# Patient Record
Sex: MALE | Race: WHITE | Employment: FULL TIME | URBAN - METROPOLITAN AREA
[De-identification: names, ages, dates, MRNs, and addresses within clinical notes are randomized per-mention and may not be internally consistent; named-entity substitution may affect disease eponyms.]

---

## 2017-04-08 ENCOUNTER — HOSPITAL ENCOUNTER (EMERGENCY)
Facility: HOSPITAL | Age: 52
Discharge: HOME | End: 2017-04-08
Attending: EMERGENCY MEDICINE
Payer: COMMERCIAL

## 2017-04-08 VITALS
HEIGHT: 70 IN | OXYGEN SATURATION: 96 % | SYSTOLIC BLOOD PRESSURE: 134 MMHG | TEMPERATURE: 99 F | HEART RATE: 84 BPM | WEIGHT: 315 LBS | DIASTOLIC BLOOD PRESSURE: 78 MMHG | RESPIRATION RATE: 20 BRPM | BODY MASS INDEX: 45.1 KG/M2

## 2017-04-08 DIAGNOSIS — T78.40XA ALLERGIC REACTION, INITIAL ENCOUNTER: Primary | ICD-10-CM

## 2017-04-08 PROCEDURE — 99284 EMERGENCY DEPT VISIT MOD MDM: CPT

## 2017-04-08 PROCEDURE — S0028 INJECTION, FAMOTIDINE, 20 MG: HCPCS | Performed by: EMERGENCY MEDICINE

## 2017-04-08 PROCEDURE — 96375 TX/PRO/DX INJ NEW DRUG ADDON: CPT

## 2017-04-08 PROCEDURE — 96361 HYDRATE IV INFUSION ADD-ON: CPT

## 2017-04-08 PROCEDURE — 96374 THER/PROPH/DIAG INJ IV PUSH: CPT

## 2017-04-08 RX ORDER — DIPHENHYDRAMINE HYDROCHLORIDE 50 MG/ML
50 INJECTION INTRAMUSCULAR; INTRAVENOUS ONCE
Status: COMPLETED | OUTPATIENT
Start: 2017-04-08 | End: 2017-04-08

## 2017-04-08 RX ORDER — METHYLPREDNISOLONE SODIUM SUCCINATE 125 MG/2ML
125 INJECTION, POWDER, LYOPHILIZED, FOR SOLUTION INTRAMUSCULAR; INTRAVENOUS ONCE
Status: COMPLETED | OUTPATIENT
Start: 2017-04-08 | End: 2017-04-08

## 2017-04-08 RX ORDER — PREDNISONE 20 MG/1
60 TABLET ORAL DAILY
Qty: 9 TABLET | Refills: 0 | Status: SHIPPED | OUTPATIENT
Start: 2017-04-08 | End: 2017-04-11

## 2017-04-08 RX ORDER — FAMOTIDINE 20 MG/1
20 TABLET ORAL 2 TIMES DAILY PRN
Qty: 30 TABLET | Refills: 0 | Status: SHIPPED | OUTPATIENT
Start: 2017-04-08 | End: 2017-05-08

## 2017-04-08 RX ORDER — FAMOTIDINE 10 MG/ML
20 INJECTION, SOLUTION INTRAVENOUS ONCE
Status: COMPLETED | OUTPATIENT
Start: 2017-04-08 | End: 2017-04-08

## 2017-04-08 RX ORDER — ONDANSETRON 2 MG/ML
4 INJECTION INTRAMUSCULAR; INTRAVENOUS ONCE
Status: COMPLETED | OUTPATIENT
Start: 2017-04-08 | End: 2017-04-08

## 2017-04-08 RX ORDER — EPINEPHRINE 0.3 MG/.3ML
0.3 INJECTION SUBCUTANEOUS
Qty: 1 EACH | Refills: 0 | Status: SHIPPED | OUTPATIENT
Start: 2017-04-08 | End: 2017-05-08

## 2017-04-08 NOTE — ED NOTES
Pt states about 10 minutes ago he started to have itching to the hands and the back of the neck and feeling flushed again.   Pt does have flushing present to neck and face which family is states is still better than on arrival.  Pt respirations unlabored an

## 2017-04-08 NOTE — ED INITIAL ASSESSMENT (HPI)
Pt woke up this morning feeling like his hands were itchy and throat was tight. States he worked overnight last night and felt nauseated last night, only ate a banana and green tea over night. Around 2 am pt took his regular medications.   And around 9am

## 2017-04-08 NOTE — ED NOTES
Pt resting on cart, redness still noted but improved. Pt states his tightness in his throat is improved.

## 2017-04-08 NOTE — ED PROVIDER NOTES
Patient Seen in: [facility] Emergency Department    History   Patient presents with: Allergic Rxn Allergies (immune)    Stated Complaint: possible allergic reaction/rash/throat feels tight    HPI    Patient presents with allergic reaction.   The pat mouth daily. No family history on file. Smoking Status: Never Smoker                        Review of Systems    Positive for stated complaint: possible allergic reaction/rash/throat feels tight  Other systems are as noted in HPI.   Constitutiona ondansetron HCl (ZOFRAN) injection 4 mg (4 mg Intravenous Given 4/8/17 2315)     The patient was given IV fluids, Zofran, Pepcid, Benadryl and Solu-Medrol. He improved significantly with the medications. His rash faded significantly and is almost gone.

## 2019-11-17 ENCOUNTER — HOSPITAL ENCOUNTER (OUTPATIENT)
Facility: HOSPITAL | Age: 54
Setting detail: OBSERVATION
Discharge: HOME | End: 2019-11-18
Attending: EMERGENCY MEDICINE | Admitting: HOSPITALIST
Payer: COMMERCIAL

## 2019-11-17 ENCOUNTER — APPOINTMENT (OUTPATIENT)
Dept: CT IMAGING | Facility: HOSPITAL | Age: 54
End: 2019-11-17
Attending: EMERGENCY MEDICINE
Payer: COMMERCIAL

## 2019-11-17 DIAGNOSIS — T78.2XXA ANAPHYLAXIS, INITIAL ENCOUNTER: Primary | ICD-10-CM

## 2019-11-17 PROBLEM — R73.9 HYPERGLYCEMIA: Status: ACTIVE | Noted: 2019-11-17

## 2019-11-17 PROCEDURE — 99220 INITIAL OBSERVATION CARE,LEVL III: CPT | Performed by: HOSPITALIST

## 2019-11-17 PROCEDURE — 99291 CRITICAL CARE FIRST HOUR: CPT | Performed by: NURSE PRACTITIONER

## 2019-11-17 PROCEDURE — 74177 CT ABD & PELVIS W/CONTRAST: CPT | Performed by: EMERGENCY MEDICINE

## 2019-11-17 RX ORDER — METHYLPREDNISOLONE SODIUM SUCCINATE 125 MG/2ML
125 INJECTION, POWDER, LYOPHILIZED, FOR SOLUTION INTRAMUSCULAR; INTRAVENOUS ONCE
Status: COMPLETED | OUTPATIENT
Start: 2019-11-17 | End: 2019-11-17

## 2019-11-17 RX ORDER — HYDROMORPHONE HYDROCHLORIDE 1 MG/ML
0.5 INJECTION, SOLUTION INTRAMUSCULAR; INTRAVENOUS; SUBCUTANEOUS ONCE
Status: COMPLETED | OUTPATIENT
Start: 2019-11-17 | End: 2019-11-17

## 2019-11-17 RX ORDER — DIPHENHYDRAMINE HYDROCHLORIDE 50 MG/ML
50 INJECTION INTRAMUSCULAR; INTRAVENOUS ONCE
Status: COMPLETED | OUTPATIENT
Start: 2019-11-17 | End: 2019-11-17

## 2019-11-17 RX ORDER — BICALUTAMIDE 50 MG/1
50 TABLET, FILM COATED ORAL DAILY
COMMUNITY
End: 2021-08-09

## 2019-11-17 RX ORDER — DIPHENHYDRAMINE HCL 25 MG
50 TABLET ORAL NIGHTLY PRN
COMMUNITY

## 2019-11-17 RX ORDER — DIPHENHYDRAMINE HYDROCHLORIDE 50 MG/ML
12.5 INJECTION INTRAMUSCULAR; INTRAVENOUS EVERY 4 HOURS PRN
Status: DISCONTINUED | OUTPATIENT
Start: 2019-11-17 | End: 2019-11-18

## 2019-11-17 RX ORDER — CLINDAMYCIN PHOSPHATE 10 MG/G
GEL TOPICAL AS NEEDED
COMMUNITY

## 2019-11-17 RX ORDER — IBUPROFEN 200 MG
400 TABLET ORAL EVERY 6 HOURS PRN
Status: ON HOLD | COMMUNITY
End: 2019-11-18

## 2019-11-17 RX ORDER — ONDANSETRON 2 MG/ML
4 INJECTION INTRAMUSCULAR; INTRAVENOUS ONCE
Status: COMPLETED | OUTPATIENT
Start: 2019-11-17 | End: 2019-11-17

## 2019-11-17 RX ORDER — CETIRIZINE HYDROCHLORIDE 10 MG/1
10 TABLET ORAL DAILY
Status: DISCONTINUED | OUTPATIENT
Start: 2019-11-18 | End: 2019-11-18

## 2019-11-17 RX ORDER — CETIRIZINE HYDROCHLORIDE 10 MG/1
10 TABLET ORAL ONCE
Status: COMPLETED | OUTPATIENT
Start: 2019-11-17 | End: 2019-11-17

## 2019-11-17 RX ORDER — ONDANSETRON 2 MG/ML
4 INJECTION INTRAMUSCULAR; INTRAVENOUS ONCE
Status: DISCONTINUED | OUTPATIENT
Start: 2019-11-17 | End: 2019-11-18

## 2019-11-17 RX ORDER — ATORVASTATIN CALCIUM 20 MG/1
20 TABLET, FILM COATED ORAL NIGHTLY
COMMUNITY

## 2019-11-17 RX ORDER — TRETINOIN 0.01 %
GEL (GRAM) TOPICAL AS NEEDED
COMMUNITY

## 2019-11-17 RX ORDER — FAMOTIDINE 20 MG/1
20 TABLET ORAL 2 TIMES DAILY
Status: DISCONTINUED | OUTPATIENT
Start: 2019-11-17 | End: 2019-11-18

## 2019-11-17 RX ORDER — ONDANSETRON 2 MG/ML
4 INJECTION INTRAMUSCULAR; INTRAVENOUS EVERY 6 HOURS PRN
Status: DISCONTINUED | OUTPATIENT
Start: 2019-11-17 | End: 2019-11-18

## 2019-11-17 RX ORDER — OXYBUTYNIN CHLORIDE 5 MG/1
10 TABLET, EXTENDED RELEASE ORAL DAILY
COMMUNITY

## 2019-11-17 RX ORDER — SODIUM CHLORIDE 9 MG/ML
INJECTION, SOLUTION INTRAVENOUS CONTINUOUS
Status: DISCONTINUED | OUTPATIENT
Start: 2019-11-17 | End: 2019-11-18

## 2019-11-17 RX ORDER — METHYLPREDNISOLONE SODIUM SUCCINATE 125 MG/2ML
60 INJECTION, POWDER, LYOPHILIZED, FOR SOLUTION INTRAMUSCULAR; INTRAVENOUS EVERY 8 HOURS
Status: DISCONTINUED | OUTPATIENT
Start: 2019-11-17 | End: 2019-11-18

## 2019-11-17 RX ORDER — ACETAMINOPHEN 325 MG/1
650 TABLET ORAL EVERY 6 HOURS PRN
Status: DISCONTINUED | OUTPATIENT
Start: 2019-11-17 | End: 2019-11-18

## 2019-11-17 RX ORDER — ENOXAPARIN SODIUM 100 MG/ML
0.5 INJECTION SUBCUTANEOUS DAILY
Status: DISCONTINUED | OUTPATIENT
Start: 2019-11-17 | End: 2019-11-18

## 2019-11-17 RX ORDER — HYDROMORPHONE HYDROCHLORIDE 1 MG/ML
0.5 INJECTION, SOLUTION INTRAMUSCULAR; INTRAVENOUS; SUBCUTANEOUS ONCE
Status: COMPLETED | OUTPATIENT
Start: 2019-11-17 | End: 2019-11-18

## 2019-11-17 RX ORDER — DIPHENHYDRAMINE HCL 25 MG
25 CAPSULE ORAL EVERY 4 HOURS PRN
Status: DISCONTINUED | OUTPATIENT
Start: 2019-11-17 | End: 2019-11-18

## 2019-11-17 RX ORDER — SODIUM CHLORIDE 9 MG/ML
INJECTION, SOLUTION INTRAVENOUS CONTINUOUS
Status: DISCONTINUED | OUTPATIENT
Start: 2019-11-17 | End: 2019-11-17

## 2019-11-17 RX ORDER — FAMOTIDINE 10 MG/ML
20 INJECTION, SOLUTION INTRAVENOUS ONCE
Status: COMPLETED | OUTPATIENT
Start: 2019-11-17 | End: 2019-11-17

## 2019-11-17 RX ORDER — SILDENAFIL 25 MG/1
25 TABLET, FILM COATED ORAL DAILY
COMMUNITY
End: 2019-11-17

## 2019-11-17 NOTE — ED PROVIDER NOTES
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 ADRENALS:  No mass or enlargement. AORTA/VASCULAR:  No aneurysm or dissection. RETROPERITONEUM:  No mass or adenopathy.       BOWEL/MESENTERY:  Stomach is distended with fluid and food debris and is     otherwis 11/17/2019 at 14:05                     MDM     Patient treated immediately with epinephrine Solu-Medrol Benadryl and Pepcid in the emergency department with immediate improvement in the flushing and symptoms.   The abdominal pain also seemed to improve tho

## 2019-11-17 NOTE — ED NOTES
First contact with patient. Very limited erythema noted across body, slightly on back of neck. Lips and tongue feel normal. Pt is aware of his imminent admission.

## 2019-11-17 NOTE — H&P
[facility]ITAL[facility]  History and Physical     Josiah Leilani Patient Status:  Emergency    1/10/1965 MRN [id number]   Location [facility] Attending Tiuts Chavez MD   Hosp Day # 0 PCP ALANA CLEMENS     Chief Complaint: Vomiting, sounds. No rebound, guarding or organomegaly. Neurologic: No focal neurological deficits. CNII-XII grossly intact. Musculoskeletal: Moves all extremities. Extremities: No edema or cyanosis. Integument:Hives  Psychiatric: Appropriate mood and affect.

## 2019-11-17 NOTE — PROGRESS NOTES
[facility] Pharmacy Note:  Anticoagulation Weight Dose Adjustment for enoxaparin (LOVENOX)    Britni Edmond is a 62year old male who has been prescribed enoxaparin (LOVENOX) 40mg every 24 hours.   CrCl is estimated creatinine clearance is 113.6 mL/min (based on SCr

## 2019-11-17 NOTE — ED INITIAL ASSESSMENT (HPI)
Mid lower abdominal pain x7 hours. C/o nausea, vomiting, diarrhea. Afebrile. Went out to eat last night. Spouse ate same thing as pt.   Nothing unusual

## 2019-11-18 VITALS
SYSTOLIC BLOOD PRESSURE: 125 MMHG | DIASTOLIC BLOOD PRESSURE: 75 MMHG | RESPIRATION RATE: 20 BRPM | HEART RATE: 86 BPM | BODY MASS INDEX: 30.49 KG/M2 | OXYGEN SATURATION: 95 % | WEIGHT: 212.94 LBS | TEMPERATURE: 98 F | HEIGHT: 70 IN

## 2019-11-18 PROCEDURE — 99217 OBSERVATION CARE DISCHARGE: CPT | Performed by: HOSPITALIST

## 2019-11-18 RX ORDER — HYDROMORPHONE HYDROCHLORIDE 1 MG/ML
0.5 INJECTION, SOLUTION INTRAMUSCULAR; INTRAVENOUS; SUBCUTANEOUS ONCE
Status: COMPLETED | OUTPATIENT
Start: 2019-11-18 | End: 2019-11-18

## 2019-11-18 RX ORDER — METHYLPREDNISOLONE 4 MG/1
TABLET ORAL
Qty: 21 TABLET | Refills: 0 | Status: SHIPPED | OUTPATIENT
Start: 2019-11-18 | End: 2021-08-09

## 2019-11-18 RX ORDER — IBUPROFEN 400 MG/1
400 TABLET ORAL EVERY 6 HOURS PRN
Status: DISCONTINUED | OUTPATIENT
Start: 2019-11-18 | End: 2019-11-18

## 2019-11-18 RX ORDER — CETIRIZINE HYDROCHLORIDE 10 MG/1
10 TABLET ORAL DAILY
Qty: 30 TABLET | Refills: 0 | Status: SHIPPED | OUTPATIENT
Start: 2019-11-19

## 2019-11-18 NOTE — PROGRESS NOTES
ICU  Critical Care APRN Progress Note    NAME: Abel Clayton - ROOM: [address] [place], [place] [address] - MRN: [id number] - Age: 62year old - :1/10/1965    History Of Present Illness:  Abel Clayton is a 62year old male with PMHx significant for prostate cancer, hypertension and h General Appearance: Alert, cooperative, no distress, appears stated age  Neck: No JVD, neck supple, no adenopathy, trachea midline, no carotid bruits, airway patent, no difficulty breathing   Lungs: Clear to auscultation bilaterally, respirations unlabored -allergy referral on d/c for further workup    #Htn  -maintain SBP<160    #hld  -statin per IM           F/E/N:  Reg diet     Proph:  -SQ lovenox  -SCD    Dispo:   -Full code    Plan of care discussed with intensivist on-call, Dr. Deepika Live

## 2019-11-18 NOTE — PROGRESS NOTES
Patient with pain overnight, now resolved. Observe past lunch, if doing well will discharge. He has obtained follow up with allergist at [facility]. He has an epipen.  Given prolonged symptoms, will treat with medrol dose pack and [facility]

## 2019-11-18 NOTE — CONSULTS
[facility]  Report of Consultation    Cristela Grey Patient Status:  Observation    1/10/1965 MRN [id number]   [facility] 4SW-A Attending Roxana Paniagua MD   Hosp Day # 0 PCP Garry Flor     Reason for Consultation: Anaphylactic reac History   Problem Relation Age of Onset   • Cancer Father         lung   • Heart Disorder Father         a fib   • Heart Disorder Mother         afib   • Cancer Maternal Grandfather         prostate   • Heart Disorder Maternal Grandfather         cbag Constitutional: Negative for anorexia, chills, fatigue, fevers, malaise, night sweats and weight loss. Eyes: Negative for visual disturbance, irritation and redness.   Ears, nose, mouth, throat, and face: Negative for hearing loss, tinnitus, nasal conges midline, no adenopathy, no thyromegaly. Lungs: Clear to auscultation and percussion   Chest wall: No tenderness or deformity. Heart: Regular rate and rhythm, normal sinus rhythm, normal S1S2, no murmur.    Abdomen: soft, non-tender, non-distended, no ma

## 2019-11-18 NOTE — PLAN OF CARE
Assumed care at [address] pt on bed, on room air. C/o nausea and headache. Zofran and Tylenol given. ST on tele 100s  Slight redness on the back of the neck. Solumedrol. Vital sign stable. Plan:d/c home tomorrow.     Problem: Patient/Family Goals  Goal PAIN - ADULT  Goal: Verbalizes/displays adequate comfort level or patient's stated pain goal  Description  INTERVENTIONS:  - Encourage pt to monitor pain and request assistance  - Assess pain using appropriate pain scale  - Administer analgesics based on t

## 2019-11-18 NOTE — PLAN OF CARE
Received pt this AM. A&Ox4 with complaints of HA. PO Motrin given with relief. Pt OOB independently. No residual anaphylactic symptoms. Ate lunch this afternoon and pt \"feeling much better\". Pt stable for d/c home.  Explained Dr. Eden Cantrell

## 2019-11-18 NOTE — PLAN OF CARE
Assumed care of patient around 1700, alert and oriented X4, spouse at bedside, no c/o CP/SOB, SpO2 96 % on RA  Rhythm/HR: ST 110s    Slight rash to back of neck  Admission navigator completed  Plan of care discussed  Call light within reach, oriented to ro

## 2019-11-19 NOTE — DISCHARGE SUMMARY
[facility]OSPITAL[facility]  DISCHARGE SUMMARY     Britni Edmond Patient Status:  Observation    1/10/1965 MRN [id number]   [facility] 4SW-A Attending No att. providers found   Hosp Day # 0 PCP Alicia Ace     Date of Admission: 2019  Date of Instructions Prescription details   aspirin 81 MG Tabs      Take 81 mg by mouth daily. Refills:  0     atorvastatin 20 MG Tabs  Commonly known as:  LIPITOR      Take 20 mg by mouth nightly.    Refills:  0     B COMPLEX OR      Take 1 tablet by mouth d rhonchi. Cardiovascular: S1, S2. Regular rate and rhythm. No murmurs, rubs or gallops. Abdomen: Soft, nontender, nondistended. Positive bowel sounds. No rebound or guarding. Neurologic: No focal neurological deficits.    Musculoskeletal: Moves all extr

## 2021-08-09 ENCOUNTER — HOSPITAL ENCOUNTER (OUTPATIENT)
Age: 56
Discharge: HOME | End: 2021-08-09
Payer: COMMERCIAL

## 2021-08-09 VITALS
WEIGHT: 220 LBS | OXYGEN SATURATION: 97 % | HEART RATE: 96 BPM | TEMPERATURE: 99 F | RESPIRATION RATE: 16 BRPM | BODY MASS INDEX: 31.5 KG/M2 | SYSTOLIC BLOOD PRESSURE: 133 MMHG | HEIGHT: 70 IN | DIASTOLIC BLOOD PRESSURE: 92 MMHG

## 2021-08-09 DIAGNOSIS — U07.1 COVID-19: Primary | ICD-10-CM

## 2021-08-09 LAB — SARS-COV-2 RNA RESP QL NAA+PROBE: DETECTED

## 2021-08-09 PROCEDURE — 99213 OFFICE O/P EST LOW 20 MIN: CPT

## 2021-08-09 PROCEDURE — 99453 REM MNTR PHYSIOL PARAM SETUP: CPT

## 2021-08-09 PROCEDURE — 99214 OFFICE O/P EST MOD 30 MIN: CPT

## 2021-08-09 RX ORDER — PIMECROLIMUS 10 MG/G
CREAM TOPICAL 2 TIMES DAILY
COMMUNITY
Start: 2021-04-01

## 2021-08-09 RX ORDER — ONDANSETRON 8 MG/1
8 TABLET, ORALLY DISINTEGRATING ORAL AS NEEDED
COMMUNITY
Start: 2021-07-27

## 2021-08-09 RX ORDER — EPINEPHRINE 0.3 MG/.3ML
0.3 INJECTION SUBCUTANEOUS AS NEEDED
COMMUNITY
Start: 2021-07-27

## 2021-08-09 NOTE — ED PROVIDER NOTES
Patient Seen in: [facility] [place]      History   Patient presents with:  Ear Problem Pain    Stated Complaint: cough / ear pain aches    HPI/Subjective:   HPI    23-year-old male who comes in today complaining of having 4 days of ear pain, coug for age   Head: Normocephalic, without obvious abnormality   Eyes:  conjunctiva and cornea clear, both eyes   Ears: TMs bilaterally pearly gray with good light reflex.  ear canals clear bilaterally no mastoid tenderness  Throat: no trismus or drooling no ph patient quarantine instructions, COVID-19 instructions and conservative care for viral syndrome including over-the-counter medications.    Patient will remain self quarantined for 10 days from the start of symptoms or 10 days from positive test of asymptoma

## 2021-08-09 NOTE — ED QUICK NOTES
Patient resting comfortably. Infusion has completed and was flushed. Wife also in the room with the patient. Denies needing anything at this time.

## 2021-08-09 NOTE — ED INITIAL ASSESSMENT (HPI)
Started with right ear pain on Thursday, a cough, and nasal congestion. The cough is generally dry and only coughed up green mucus x 1. Friday night he states he felt generalize fatigue and not feeling well.  Today he is running a low grade fever and chills

## 2022-02-22 ENCOUNTER — HOSPITAL ENCOUNTER (EMERGENCY)
Facility: HOSPITAL | Age: 57
Discharge: HOME | End: 2022-02-23
Attending: EMERGENCY MEDICINE
Payer: COMMERCIAL

## 2022-02-22 DIAGNOSIS — R31.0 GROSS HEMATURIA: Primary | ICD-10-CM

## 2022-02-22 LAB
ALBUMIN SERPL-MCNC: 3.5 G/DL (ref 3.4–5)
ALBUMIN/GLOB SERPL: 1.1 {RATIO} (ref 1–2)
ALP LIVER SERPL-CCNC: 48 U/L
ALT SERPL-CCNC: 32 U/L
ANION GAP SERPL CALC-SCNC: 1 MMOL/L (ref 0–18)
AST SERPL-CCNC: 31 U/L (ref 15–37)
BASOPHILS # BLD AUTO: 0.03 X10(3) UL (ref 0–0.2)
BASOPHILS NFR BLD AUTO: 0.6 %
BILIRUB SERPL-MCNC: 0.4 MG/DL (ref 0.1–2)
BILIRUB UR QL STRIP.AUTO: NEGATIVE
BUN BLD-MCNC: 9 MG/DL (ref 7–18)
CALCIUM BLD-MCNC: 9 MG/DL (ref 8.5–10.1)
CHLORIDE SERPL-SCNC: 108 MMOL/L (ref 98–112)
CLARITY UR REFRACT.AUTO: CLEAR
CO2 SERPL-SCNC: 31 MMOL/L (ref 21–32)
CREAT BLD-MCNC: 0.77 MG/DL
EOSINOPHIL # BLD AUTO: 0.17 X10(3) UL (ref 0–0.7)
EOSINOPHIL NFR BLD AUTO: 3.2 %
ERYTHROCYTE [DISTWIDTH] IN BLOOD BY AUTOMATED COUNT: 13.3 %
GLOBULIN PLAS-MCNC: 3.2 G/DL (ref 2.8–4.4)
GLUCOSE BLD-MCNC: 91 MG/DL (ref 70–99)
GLUCOSE UR STRIP.AUTO-MCNC: NEGATIVE MG/DL
HCT VFR BLD AUTO: 43.5 %
HGB BLD-MCNC: 13.9 G/DL
IMM GRANULOCYTES # BLD AUTO: 0.02 X10(3) UL (ref 0–1)
IMM GRANULOCYTES NFR BLD: 0.4 %
KETONES UR STRIP.AUTO-MCNC: NEGATIVE MG/DL
LEUKOCYTE ESTERASE UR QL STRIP.AUTO: NEGATIVE
LYMPHOCYTES # BLD AUTO: 1.26 X10(3) UL (ref 1–4)
LYMPHOCYTES NFR BLD AUTO: 23.7 %
MCH RBC QN AUTO: 27.6 PG (ref 26–34)
MCHC RBC AUTO-ENTMCNC: 32 G/DL (ref 31–37)
MCV RBC AUTO: 86.5 FL
MONOCYTES # BLD AUTO: 0.73 X10(3) UL (ref 0.1–1)
MONOCYTES NFR BLD AUTO: 13.7 %
NEUTROPHILS # BLD AUTO: 3.1 X10 (3) UL (ref 1.5–7.7)
NEUTROPHILS # BLD AUTO: 3.1 X10(3) UL (ref 1.5–7.7)
NEUTROPHILS NFR BLD AUTO: 58.4 %
NITRITE UR QL STRIP.AUTO: NEGATIVE
OSMOLALITY SERPL CALC.SUM OF ELEC: 288 MOSM/KG (ref 275–295)
PH UR STRIP.AUTO: 8 [PH] (ref 5–8)
PLATELET # BLD AUTO: 152 10(3)UL (ref 150–450)
POTASSIUM SERPL-SCNC: 4.4 MMOL/L (ref 3.5–5.1)
PROT SERPL-MCNC: 6.7 G/DL (ref 6.4–8.2)
PROT UR STRIP.AUTO-MCNC: NEGATIVE MG/DL
RBC # BLD AUTO: 5.03 X10(6)UL
RBC #/AREA URNS AUTO: >10 /HPF
SODIUM SERPL-SCNC: 140 MMOL/L (ref 136–145)
SP GR UR STRIP.AUTO: 1 (ref 1–1.03)
UROBILINOGEN UR STRIP.AUTO-MCNC: <2 MG/DL
WBC # BLD AUTO: 5.3 X10(3) UL (ref 4–11)

## 2022-02-22 PROCEDURE — 85025 COMPLETE CBC W/AUTO DIFF WBC: CPT

## 2022-02-22 PROCEDURE — 81001 URINALYSIS AUTO W/SCOPE: CPT

## 2022-02-22 PROCEDURE — 80053 COMPREHEN METABOLIC PANEL: CPT

## 2022-02-22 PROCEDURE — 80053 COMPREHEN METABOLIC PANEL: CPT | Performed by: EMERGENCY MEDICINE

## 2022-02-22 PROCEDURE — 99284 EMERGENCY DEPT VISIT MOD MDM: CPT

## 2022-02-22 PROCEDURE — 85025 COMPLETE CBC W/AUTO DIFF WBC: CPT | Performed by: EMERGENCY MEDICINE

## 2022-02-22 PROCEDURE — 81001 URINALYSIS AUTO W/SCOPE: CPT | Performed by: EMERGENCY MEDICINE

## 2022-02-22 PROCEDURE — 36415 COLL VENOUS BLD VENIPUNCTURE: CPT

## 2022-02-23 ENCOUNTER — APPOINTMENT (OUTPATIENT)
Dept: CT IMAGING | Facility: HOSPITAL | Age: 57
End: 2022-02-23
Attending: EMERGENCY MEDICINE
Payer: COMMERCIAL

## 2022-02-23 VITALS
TEMPERATURE: 98 F | RESPIRATION RATE: 16 BRPM | DIASTOLIC BLOOD PRESSURE: 87 MMHG | WEIGHT: 230 LBS | SYSTOLIC BLOOD PRESSURE: 133 MMHG | OXYGEN SATURATION: 97 % | BODY MASS INDEX: 32.93 KG/M2 | HEART RATE: 65 BPM | HEIGHT: 70 IN

## 2022-02-23 PROCEDURE — 74177 CT ABD & PELVIS W/CONTRAST: CPT | Performed by: EMERGENCY MEDICINE

## 2022-02-23 RX ORDER — IOHEXOL 350 MG/ML
100 INJECTION, SOLUTION INTRAVENOUS
Status: COMPLETED | OUTPATIENT
Start: 2022-02-23 | End: 2022-02-23

## 2022-02-23 NOTE — ED INITIAL ASSESSMENT (HPI)
C/o bloody urine for about a month. Pt states to day he has passed a couple clots today. Hx of prostate cancer. No issues urinating, just passing clots.

## 2022-05-27 ENCOUNTER — IMMUNIZATION (OUTPATIENT)
Dept: LAB | Age: 57
End: 2022-05-27
Attending: EMERGENCY MEDICINE
Payer: COMMERCIAL

## 2022-05-27 ENCOUNTER — HBO CONSULT (OUTPATIENT)
Dept: WOUND CARE | Facility: HOSPITAL | Age: 57
End: 2022-05-27
Attending: INTERNAL MEDICINE
Payer: COMMERCIAL

## 2022-05-27 VITALS
HEART RATE: 73 BPM | DIASTOLIC BLOOD PRESSURE: 81 MMHG | SYSTOLIC BLOOD PRESSURE: 128 MMHG | RESPIRATION RATE: 16 BRPM | HEIGHT: 70 IN | TEMPERATURE: 99 F | BODY MASS INDEX: 33.64 KG/M2 | WEIGHT: 235 LBS

## 2022-05-27 DIAGNOSIS — G47.33 OBSTRUCTIVE SLEEP APNEA OF ADULT: ICD-10-CM

## 2022-05-27 DIAGNOSIS — E66.09 CLASS 1 OBESITY DUE TO EXCESS CALORIES WITH SERIOUS COMORBIDITY AND BODY MASS INDEX (BMI) OF 33.0 TO 33.9 IN ADULT: ICD-10-CM

## 2022-05-27 DIAGNOSIS — I25.10 CORONARY ARTERY DISEASE INVOLVING NATIVE HEART WITHOUT ANGINA PECTORIS, UNSPECIFIED VESSEL OR LESION TYPE: ICD-10-CM

## 2022-05-27 DIAGNOSIS — E11.9 CONTROLLED TYPE 2 DIABETES MELLITUS WITHOUT COMPLICATION, WITHOUT LONG-TERM CURRENT USE OF INSULIN (HCC): ICD-10-CM

## 2022-05-27 DIAGNOSIS — Z23 NEED FOR VACCINATION: Primary | ICD-10-CM

## 2022-05-27 DIAGNOSIS — C61 PROSTATE CANCER (HCC): ICD-10-CM

## 2022-05-27 DIAGNOSIS — N30.40 CHRONIC RADIATION CYSTITIS: Primary | ICD-10-CM

## 2022-05-27 PROCEDURE — 0064A SARSCOV2 VAC 50MCG/0.25ML IM: CPT

## 2022-05-27 PROCEDURE — 99213 OFFICE O/P EST LOW 20 MIN: CPT

## 2022-05-27 NOTE — PROGRESS NOTES
Weekly Wound Education Note    Teaching Provided To: Patient; Family  Training Topics: Discharge instructions;HBO  Training Method: Explain/Verbal  Training Response: Patient responds and understands        Notes: HBO consult. Pt to return next week for a tech consult.

## 2022-06-02 ENCOUNTER — HBO CONSULT (OUTPATIENT)
Dept: WOUND CARE | Facility: HOSPITAL | Age: 57
End: 2022-06-02
Attending: INTERNAL MEDICINE
Payer: COMMERCIAL

## 2022-08-03 ENCOUNTER — HOSPITAL ENCOUNTER (OUTPATIENT)
Age: 57
Discharge: HOME | End: 2022-08-03
Payer: COMMERCIAL

## 2022-08-03 VITALS
OXYGEN SATURATION: 96 % | SYSTOLIC BLOOD PRESSURE: 138 MMHG | TEMPERATURE: 99 F | WEIGHT: 230 LBS | BODY MASS INDEX: 32.93 KG/M2 | DIASTOLIC BLOOD PRESSURE: 99 MMHG | HEIGHT: 70 IN | HEART RATE: 90 BPM | RESPIRATION RATE: 18 BRPM

## 2022-08-03 DIAGNOSIS — U07.1 COVID: Primary | ICD-10-CM

## 2022-08-03 LAB — SARS-COV-2 RNA RESP QL NAA+PROBE: DETECTED

## 2022-08-03 PROCEDURE — 99213 OFFICE O/P EST LOW 20 MIN: CPT

## 2022-08-03 RX ORDER — NIRMATRELVIR AND RITONAVIR 300-100 MG
KIT ORAL
Qty: 30 TABLET | Refills: 0 | Status: SHIPPED | OUTPATIENT
Start: 2022-08-03 | End: 2022-08-08

## 2022-08-16 ENCOUNTER — TELEPHONE (OUTPATIENT)
Dept: WOUND CARE | Facility: HOSPITAL | Age: 57
End: 2022-08-16

## 2022-08-22 ENCOUNTER — HBO THERAPY VISIT (OUTPATIENT)
Dept: WOUND CARE | Facility: HOSPITAL | Age: 57
End: 2022-08-22
Attending: INTERNAL MEDICINE
Payer: COMMERCIAL

## 2022-08-22 VITALS
TEMPERATURE: 98 F | RESPIRATION RATE: 10 BRPM | SYSTOLIC BLOOD PRESSURE: 153 MMHG | DIASTOLIC BLOOD PRESSURE: 108 MMHG | HEART RATE: 65 BPM

## 2022-08-22 LAB
GLUCOSE BLD-MCNC: 102 MG/DL (ref 70–99)
GLUCOSE BLD-MCNC: 90 MG/DL (ref 70–99)

## 2022-08-22 PROCEDURE — 82962 GLUCOSE BLOOD TEST: CPT | Performed by: INTERNAL MEDICINE

## 2022-08-22 NOTE — PROGRESS NOTES
Pre/Post Treatment Evaluation      Pre-Treatment Lung Evaluation: Clear to auscultation bilaterally    Left Ear Pre-Treatment Evaluation  Left Ear Clear: Yes  Left Ear Intact: Yes  Left Cerumen Removal: No  Pre Left teed stgstrstastdstest:st st1st grade    Right Ear Pre-Treatment Evaluation  Right Ear Clear: Yes  Right Ear Intact: Yes  Right Cerumen Removal: No  Pre Right teed stgstrstastdstest:st st1st grade      Post-Treatment Lung Evaluation: Clear to auscultation bilaterally    Left Ear Post-Treatment Evaluation  Left Ear Clear: Yes  Left Ear Intact: Yes  Left Cerumen Removal: No  Post Left teed stgstrstastdstest:st st1st grade    Right Ear Post-Treatment Evaluation  Right Ear Clear: Yes  Right Ear Intact: Yes  Right Cerumen Removal: No  Post Right teed stgstrstastdstest:st st1st grade      I supervised this Hyperbaric treatment and was immediately available throughout the course of the treatment. There is a trained emergency support team and ICU available at this facility to assist with complications.

## 2022-08-23 ENCOUNTER — HBO THERAPY VISIT (OUTPATIENT)
Dept: WOUND CARE | Facility: HOSPITAL | Age: 57
End: 2022-08-23
Attending: FAMILY MEDICINE
Payer: COMMERCIAL

## 2022-08-23 VITALS
DIASTOLIC BLOOD PRESSURE: 87 MMHG | HEART RATE: 79 BPM | SYSTOLIC BLOOD PRESSURE: 146 MMHG | RESPIRATION RATE: 10 BRPM | TEMPERATURE: 98 F

## 2022-08-23 PROCEDURE — 99183 HYPERBARIC OXYGEN THERAPY: CPT | Performed by: FAMILY MEDICINE

## 2022-08-23 NOTE — PROGRESS NOTES
Pre/Post Treatment Evaluation      Pre-Treatment Lung Evaluation: Clear to auscultation bilaterally    Left Ear Pre-Treatment Evaluation  Left Ear Clear: Yes  Left Ear Intact: Yes  Left Cerumen Removal: No  Pre Left teed stgstrstastdstest:st st1st grade    Right Ear Pre-Treatment Evaluation  Right Ear Clear: Yes  Right Ear Intact: Yes  Right Cerumen Removal: No  Pre Right teed stgstrstastdstest:st st1st grade      Post-Treatment Lung Evaluation: Clear to auscultation bilaterally    Left Ear Post-Treatment Evaluation  Left Ear Clear: Yes  Left Ear Intact: Yes  Left Cerumen Removal: No  Post Left teed stgstrstastdstest:st st1st grade    Right Ear Post-Treatment Evaluation  Right Ear Clear: Yes  Right Ear Intact: Yes  Right Cerumen Removal: No  Post Right teed stgstrstastdstest:st st1st grade      I supervised this Hyperbaric treatment and was immediately available throughout the course of the treatment. There is a trained emergency support team and ICU available at this facility to assist with complications. Pre/Post Treatment Evaluation      Pre-Treatment Lung Evaluation: Clear to auscultation bilaterally    Left Ear Pre-Treatment Evaluation  Left Ear Clear: Yes  Left Ear Intact: Yes  Left Cerumen Removal: No  Pre Left teed stgstrstastdstest:st st1st grade    Right Ear Pre-Treatment Evaluation  Right Ear Clear: Yes  Right Ear Intact: Yes  Right Cerumen Removal: No  Pre Right teed stgstrstastdstest:st st1st grade           Left Ear Post-Treatment Evaluation                Right Ear Post-Treatment Evaluation                  I supervised this Hyperbaric treatment and was immediately available throughout the course of the treatment. There is a trained emergency support team and ICU available at this facility to assist with complications.

## 2022-08-24 ENCOUNTER — HBO THERAPY VISIT (OUTPATIENT)
Dept: WOUND CARE | Facility: HOSPITAL | Age: 57
End: 2022-08-24
Attending: INTERNAL MEDICINE
Payer: COMMERCIAL

## 2022-08-24 VITALS
SYSTOLIC BLOOD PRESSURE: 150 MMHG | DIASTOLIC BLOOD PRESSURE: 93 MMHG | RESPIRATION RATE: 14 BRPM | HEART RATE: 60 BPM | TEMPERATURE: 98 F
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
## 2022-08-24 DIAGNOSIS — L59.8 SOFT TISSUE RADIONECROSIS: ICD-10-CM

## 2022-08-25 ENCOUNTER — HBO THERAPY VISIT (OUTPATIENT)
Dept: WOUND CARE | Facility: HOSPITAL | Age: 57
End: 2022-08-25
Attending: FAMILY MEDICINE
Payer: COMMERCIAL

## 2022-08-25 VITALS
RESPIRATION RATE: 10 BRPM | HEART RATE: 64 BPM | TEMPERATURE: 98 F | DIASTOLIC BLOOD PRESSURE: 101 MMHG | SYSTOLIC BLOOD PRESSURE: 157 MMHG

## 2022-08-25 PROCEDURE — 99183 HYPERBARIC OXYGEN THERAPY: CPT | Performed by: FAMILY MEDICINE

## 2022-08-26 ENCOUNTER — HBO THERAPY VISIT (OUTPATIENT)
Dept: WOUND CARE | Facility: HOSPITAL | Age: 57
End: 2022-08-26
Attending: INTERNAL MEDICINE
Payer: COMMERCIAL

## 2022-08-26 VITALS
TEMPERATURE: 98 F | SYSTOLIC BLOOD PRESSURE: 143 MMHG | RESPIRATION RATE: 10 BRPM | HEART RATE: 62 BPM | DIASTOLIC BLOOD PRESSURE: 85 MMHG

## 2022-08-29 ENCOUNTER — HBO THERAPY VISIT (OUTPATIENT)
Dept: WOUND CARE | Facility: HOSPITAL | Age: 57
End: 2022-08-29
Attending: INTERNAL MEDICINE
Payer: COMMERCIAL

## 2022-08-29 VITALS
HEART RATE: 61 BPM | SYSTOLIC BLOOD PRESSURE: 145 MMHG | TEMPERATURE: 98 F | DIASTOLIC BLOOD PRESSURE: 94 MMHG | RESPIRATION RATE: 10 BRPM

## 2022-08-30 ENCOUNTER — HBO THERAPY VISIT (OUTPATIENT)
Dept: WOUND CARE | Facility: HOSPITAL | Age: 57
End: 2022-08-30
Attending: FAMILY MEDICINE
Payer: COMMERCIAL

## 2022-08-30 VITALS
HEART RATE: 64 BPM | RESPIRATION RATE: 10 BRPM | TEMPERATURE: 98 F | SYSTOLIC BLOOD PRESSURE: 142 MMHG | DIASTOLIC BLOOD PRESSURE: 92 MMHG

## 2022-08-31 ENCOUNTER — HOSPITAL ENCOUNTER (OUTPATIENT)
Age: 57
Discharge: HOME | End: 2022-08-31
Payer: COMMERCIAL

## 2022-08-31 ENCOUNTER — APPOINTMENT (OUTPATIENT)
Dept: CT IMAGING | Age: 57
End: 2022-08-31
Attending: NURSE PRACTITIONER
Payer: COMMERCIAL

## 2022-08-31 ENCOUNTER — HBO THERAPY VISIT (OUTPATIENT)
Dept: WOUND CARE | Facility: HOSPITAL | Age: 57
End: 2022-08-31
Attending: INTERNAL MEDICINE
Payer: COMMERCIAL

## 2022-08-31 VITALS
RESPIRATION RATE: 18 BRPM | BODY MASS INDEX: 32.93 KG/M2 | HEIGHT: 70 IN | OXYGEN SATURATION: 98 % | HEART RATE: 66 BPM | TEMPERATURE: 99 F | WEIGHT: 230 LBS | SYSTOLIC BLOOD PRESSURE: 137 MMHG | DIASTOLIC BLOOD PRESSURE: 91 MMHG

## 2022-08-31 VITALS
RESPIRATION RATE: 12 BRPM | SYSTOLIC BLOOD PRESSURE: 142 MMHG | TEMPERATURE: 99 F | HEART RATE: 79 BPM | DIASTOLIC BLOOD PRESSURE: 89 MMHG

## 2022-08-31 DIAGNOSIS — R31.9 HEMATURIA, UNSPECIFIED TYPE: ICD-10-CM

## 2022-08-31 DIAGNOSIS — R30.0 DYSURIA: Primary | ICD-10-CM
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
## 2022-08-31 DIAGNOSIS — L59.8 SOFT TISSUE RADIONECROSIS: ICD-10-CM

## 2022-08-31 LAB
#MXD IC: 1 X10ˆ3/UL (ref 0.1–1)
BUN BLD-MCNC: 8 MG/DL (ref 7–18)
CHLORIDE BLD-SCNC: 102 MMOL/L (ref 98–112)
CO2 BLD-SCNC: 26 MMOL/L (ref 21–32)
CREAT BLD-MCNC: 0.8 MG/DL
GFR SERPLBLD BASED ON 1.73 SQ M-ARVRAT: 102 ML/MIN/1.73M2 (ref 60–?)
GLUCOSE BLD-MCNC: 92 MG/DL (ref 70–99)
HCT VFR BLD AUTO: 46.5 %
HCT VFR BLD CALC: 46 %
HGB BLD-MCNC: 14.4 G/DL
ISTAT IONIZED CALCIUM FOR CHEM 8: 1.24 MMOL/L (ref 1.12–1.32)
LYMPHOCYTES # BLD AUTO: 0.9 X10ˆ3/UL (ref 1–4)
LYMPHOCYTES NFR BLD AUTO: 20.5 %
MCH RBC QN AUTO: 27.4 PG (ref 26–34)
MCHC RBC AUTO-ENTMCNC: 31 G/DL (ref 31–37)
MCV RBC AUTO: 88.4 FL (ref 80–100)
MIXED CELL %: 20.7 %
NEUTROPHILS # BLD AUTO: 2.7 X10ˆ3/UL (ref 1.5–7.7)
NEUTROPHILS NFR BLD AUTO: 58.8 %
PLATELET # BLD AUTO: 154 X10ˆ3/UL (ref 150–450)
POCT BILIRUBIN URINE: NEGATIVE
POCT GLUCOSE URINE: NEGATIVE MG/DL
POCT KETONE URINE: NEGATIVE MG/DL
POCT LEUKOCYTE ESTERASE URINE: NEGATIVE
POCT NITRITE URINE: NEGATIVE
POCT PH URINE: 5.5 (ref 5–8)
POCT PROTEIN URINE: NEGATIVE MG/DL
POCT SPECIFIC GRAVITY URINE: 1.02
POCT URINE CLARITY: CLEAR
POCT URINE COLOR: YELLOW
POCT UROBILINOGEN URINE: 0.2 MG/DL
POTASSIUM BLD-SCNC: 4.6 MMOL/L (ref 3.6–5.1)
RBC # BLD AUTO: 5.26 X10ˆ6/UL
SODIUM BLD-SCNC: 140 MMOL/L (ref 136–145)
WBC # BLD AUTO: 4.6 X10ˆ3/UL (ref 4–11)

## 2022-08-31 PROCEDURE — 99214 OFFICE O/P EST MOD 30 MIN: CPT

## 2022-08-31 PROCEDURE — 36415 COLL VENOUS BLD VENIPUNCTURE: CPT

## 2022-08-31 PROCEDURE — 87491 CHLMYD TRACH DNA AMP PROBE: CPT | Performed by: NURSE PRACTITIONER

## 2022-08-31 PROCEDURE — 99215 OFFICE O/P EST HI 40 MIN: CPT

## 2022-08-31 PROCEDURE — 87591 N.GONORRHOEAE DNA AMP PROB: CPT | Performed by: NURSE PRACTITIONER

## 2022-08-31 PROCEDURE — 74177 CT ABD & PELVIS W/CONTRAST: CPT | Performed by: NURSE PRACTITIONER

## 2022-08-31 PROCEDURE — 87086 URINE CULTURE/COLONY COUNT: CPT | Performed by: NURSE PRACTITIONER

## 2022-08-31 PROCEDURE — 81002 URINALYSIS NONAUTO W/O SCOPE: CPT | Performed by: NURSE PRACTITIONER

## 2022-08-31 PROCEDURE — 80047 BASIC METABLC PNL IONIZED CA: CPT

## 2022-08-31 PROCEDURE — 85025 COMPLETE CBC W/AUTO DIFF WBC: CPT | Performed by: NURSE PRACTITIONER

## 2022-08-31 RX ORDER — SODIUM CHLORIDE 9 MG/ML
1000 INJECTION, SOLUTION INTRAVENOUS ONCE
Status: DISCONTINUED | OUTPATIENT
Start: 2022-08-31 | End: 2022-08-31

## 2022-08-31 NOTE — PROGRESS NOTES
Pre/Post Treatment Evaluation      Pre-Treatment Lung Evaluation: Clear to auscultation bilaterally    Left Ear Pre-Treatment Evaluation  Left Ear Clear: Yes  Left Ear Intact: Yes  Left Cerumen Removal: No  Pre Left teed stgstrstastdstest:st st1st grade    Right Ear Pre-Treatment Evaluation  Right Ear Clear: Yes  Right Ear Intact: Yes  Right Cerumen Removal: No  Pre Right teed stgstrstastdstest:st st1st grade      Post-Treatment Lung Evaluation: Clear to auscultation bilaterally    Left Ear Post-Treatment Evaluation  Left Ear Clear: Yes  Left Ear Intact: Yes  Left Cerumen Removal: No  Post Left teed ndgndrndanddndend:nd nd2nd grade    Right Ear Post-Treatment Evaluation  Right Ear Clear: Yes  Right Ear Intact: Yes  Right Cerumen Removal: No  Post Right teed stgstrstastdstest:st st1st grade    Treatment discontinued prematurely due to left ear pain and not able to clear the ear. Counseled pt to get OTC decongestants and use prior to next visit tomorrow. If not improved, will consult ENT>     I supervised this Hyperbaric treatment and was immediately available throughout the course of the treatment. There is a trained emergency support team and ICU available at this facility to assist with complications.     Jacques Marcos MD, 08/31/22, 9:58 AM

## 2022-08-31 NOTE — ED INITIAL ASSESSMENT (HPI)
Patient presents to IC with c/o burning with urination x 6 days.h/o prostatectomy. Burning to tip of glans as well. No fever or chills.

## 2022-09-01 ENCOUNTER — HBO THERAPY VISIT (OUTPATIENT)
Dept: WOUND CARE | Facility: HOSPITAL | Age: 57
End: 2022-09-01
Attending: FAMILY MEDICINE
Payer: COMMERCIAL

## 2022-09-01 VITALS
HEART RATE: 78 BPM | DIASTOLIC BLOOD PRESSURE: 94 MMHG | SYSTOLIC BLOOD PRESSURE: 147 MMHG | TEMPERATURE: 98 F | RESPIRATION RATE: 10 BRPM

## 2022-09-01 LAB
C TRACH DNA SPEC QL NAA+PROBE: NEGATIVE
N GONORRHOEA DNA SPEC QL NAA+PROBE: NEGATIVE

## 2022-09-02 ENCOUNTER — HBO THERAPY VISIT (OUTPATIENT)
Dept: WOUND CARE | Facility: HOSPITAL | Age: 57
End: 2022-09-02
Attending: INTERNAL MEDICINE
Payer: COMMERCIAL

## 2022-09-02 VITALS
HEART RATE: 76 BPM | DIASTOLIC BLOOD PRESSURE: 89 MMHG | TEMPERATURE: 97 F | SYSTOLIC BLOOD PRESSURE: 142 MMHG | RESPIRATION RATE: 8 BRPM

## 2022-09-05 ENCOUNTER — APPOINTMENT (OUTPATIENT)
Dept: WOUND CARE | Facility: HOSPITAL | Age: 57
End: 2022-09-05
Payer: COMMERCIAL

## 2022-09-06 ENCOUNTER — HBO THERAPY VISIT (OUTPATIENT)
Dept: WOUND CARE | Facility: HOSPITAL | Age: 57
End: 2022-09-06
Attending: FAMILY MEDICINE
Payer: COMMERCIAL

## 2022-09-06 VITALS
SYSTOLIC BLOOD PRESSURE: 154 MMHG | DIASTOLIC BLOOD PRESSURE: 91 MMHG | RESPIRATION RATE: 10 BRPM | TEMPERATURE: 97 F | HEART RATE: 68 BPM

## 2022-09-06 PROCEDURE — 99183 HYPERBARIC OXYGEN THERAPY: CPT | Performed by: FAMILY MEDICINE

## 2022-09-07 ENCOUNTER — HBO THERAPY VISIT (OUTPATIENT)
Dept: WOUND CARE | Facility: HOSPITAL | Age: 57
End: 2022-09-07
Attending: INTERNAL MEDICINE
Payer: COMMERCIAL

## 2022-09-08 ENCOUNTER — HBO THERAPY VISIT (OUTPATIENT)
Dept: WOUND CARE | Facility: HOSPITAL | Age: 57
End: 2022-09-08
Attending: FAMILY MEDICINE
Payer: COMMERCIAL

## 2022-09-08 VITALS
DIASTOLIC BLOOD PRESSURE: 108 MMHG | SYSTOLIC BLOOD PRESSURE: 154 MMHG | HEART RATE: 80 BPM | RESPIRATION RATE: 12 BRPM | TEMPERATURE: 98 F

## 2022-09-08 PROCEDURE — 99183 HYPERBARIC OXYGEN THERAPY: CPT | Performed by: FAMILY MEDICINE

## 2022-09-09 ENCOUNTER — HBO THERAPY VISIT (OUTPATIENT)
Dept: WOUND CARE | Facility: HOSPITAL | Age: 57
End: 2022-09-09
Attending: INTERNAL MEDICINE
Payer: COMMERCIAL

## 2022-09-09 VITALS
HEART RATE: 81 BPM | RESPIRATION RATE: 16 BRPM | DIASTOLIC BLOOD PRESSURE: 93 MMHG | SYSTOLIC BLOOD PRESSURE: 153 MMHG | TEMPERATURE: 98 F

## 2022-09-12 ENCOUNTER — APPOINTMENT (OUTPATIENT)
Dept: WOUND CARE | Facility: HOSPITAL | Age: 57
End: 2022-09-12
Payer: COMMERCIAL

## 2022-09-13 ENCOUNTER — APPOINTMENT (OUTPATIENT)
Dept: WOUND CARE | Facility: HOSPITAL | Age: 57
End: 2022-09-13
Payer: COMMERCIAL

## 2022-09-13 ENCOUNTER — HBO THERAPY VISIT (OUTPATIENT)
Dept: WOUND CARE | Facility: HOSPITAL | Age: 57
End: 2022-09-13
Attending: FAMILY MEDICINE
Payer: COMMERCIAL

## 2022-09-13 VITALS
RESPIRATION RATE: 12 BRPM | SYSTOLIC BLOOD PRESSURE: 122 MMHG | TEMPERATURE: 98 F | DIASTOLIC BLOOD PRESSURE: 81 MMHG | HEART RATE: 71 BPM

## 2022-09-13 PROCEDURE — 99183 HYPERBARIC OXYGEN THERAPY: CPT | Performed by: FAMILY MEDICINE

## 2022-09-14 ENCOUNTER — APPOINTMENT (OUTPATIENT)
Dept: WOUND CARE | Facility: HOSPITAL | Age: 57
End: 2022-09-14
Payer: COMMERCIAL

## 2022-09-14 ENCOUNTER — HBO THERAPY VISIT (OUTPATIENT)
Dept: WOUND CARE | Facility: HOSPITAL | Age: 57
End: 2022-09-14
Attending: INTERNAL MEDICINE
Payer: COMMERCIAL

## 2022-09-14 VITALS
SYSTOLIC BLOOD PRESSURE: 151 MMHG | DIASTOLIC BLOOD PRESSURE: 69 MMHG | RESPIRATION RATE: 10 BRPM | HEART RATE: 49 BPM | TEMPERATURE: 97 F

## 2022-09-15 ENCOUNTER — HBO THERAPY VISIT (OUTPATIENT)
Dept: WOUND CARE | Facility: HOSPITAL | Age: 57
End: 2022-09-15
Attending: FAMILY MEDICINE
Payer: COMMERCIAL

## 2022-09-15 ENCOUNTER — APPOINTMENT (OUTPATIENT)
Dept: WOUND CARE | Facility: HOSPITAL | Age: 57
End: 2022-09-15
Payer: COMMERCIAL

## 2022-09-15 VITALS
TEMPERATURE: 98 F | SYSTOLIC BLOOD PRESSURE: 147 MMHG | DIASTOLIC BLOOD PRESSURE: 88 MMHG | HEART RATE: 69 BPM | RESPIRATION RATE: 10 BRPM

## 2022-09-15 PROCEDURE — 99183 HYPERBARIC OXYGEN THERAPY: CPT | Performed by: FAMILY MEDICINE

## 2022-09-16 ENCOUNTER — APPOINTMENT (OUTPATIENT)
Dept: WOUND CARE | Facility: HOSPITAL | Age: 57
End: 2022-09-16
Payer: COMMERCIAL

## 2022-09-16 ENCOUNTER — HBO THERAPY VISIT (OUTPATIENT)
Dept: WOUND CARE | Facility: HOSPITAL | Age: 57
End: 2022-09-16
Attending: INTERNAL MEDICINE
Payer: COMMERCIAL

## 2022-09-16 VITALS
RESPIRATION RATE: 14 BRPM | SYSTOLIC BLOOD PRESSURE: 148 MMHG | HEART RATE: 74 BPM | TEMPERATURE: 98 F | DIASTOLIC BLOOD PRESSURE: 92 MMHG

## 2022-09-16 DIAGNOSIS — L59.8 SOFT TISSUE RADIONECROSIS: ICD-10-CM
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
## 2022-09-16 NOTE — PROGRESS NOTES
Pre/Post Treatment Evaluation      Pre-Treatment Lung Evaluation: Clear to auscultation bilaterally    Left Ear Pre-Treatment Evaluation  Left Ear Clear: Yes  Left Ear Intact: Yes  Left Cerumen Removal: No  Pre Left teed stgstrstastdstest:st st1st grade    Right Ear Pre-Treatment Evaluation  Right Ear Clear: Yes  Right Ear Intact: Yes  Right Cerumen Removal: No  Pre Right teed stgstrstastdstest:st st1st grade      Post-Treatment Lung Evaluation: Clear to auscultation bilaterally    Left Ear Post-Treatment Evaluation  Left Ear Clear: Yes  Left Ear Intact: Yes  Left Cerumen Removal: No  Post Left teed ndgndrndanddndend:nd nd2nd grade    Right Ear Post-Treatment Evaluation  Right Ear Clear: Yes  Right Ear Intact: Yes  Right Cerumen Removal: No  Post Right teed stgstrstastdstest:st st1st grade    C/o difficulty clearing left ear. TEED 1 on post dive exam left ear. Advised to use flonase and sudafed. I supervised this Hyperbaric treatment and was immediately available throughout the course of the treatment. There is a trained emergency support team and ICU available at this facility to assist with complications.

## 2022-09-19 ENCOUNTER — APPOINTMENT (OUTPATIENT)
Dept: WOUND CARE | Facility: HOSPITAL | Age: 57
End: 2022-09-19
Payer: COMMERCIAL

## 2022-09-20 ENCOUNTER — HBO THERAPY VISIT (OUTPATIENT)
Dept: WOUND CARE | Facility: HOSPITAL | Age: 57
End: 2022-09-20
Attending: FAMILY MEDICINE
Payer: COMMERCIAL

## 2022-09-20 ENCOUNTER — APPOINTMENT (OUTPATIENT)
Dept: WOUND CARE | Facility: HOSPITAL | Age: 57
End: 2022-09-20
Payer: COMMERCIAL

## 2022-09-20 VITALS
RESPIRATION RATE: 10 BRPM | HEART RATE: 76 BPM | DIASTOLIC BLOOD PRESSURE: 95 MMHG | SYSTOLIC BLOOD PRESSURE: 150 MMHG | TEMPERATURE: 98 F

## 2022-09-20 PROCEDURE — 99183 HYPERBARIC OXYGEN THERAPY: CPT | Performed by: FAMILY MEDICINE

## 2022-09-21 ENCOUNTER — HBO THERAPY VISIT (OUTPATIENT)
Dept: WOUND CARE | Facility: HOSPITAL | Age: 57
End: 2022-09-21
Attending: INTERNAL MEDICINE
Payer: COMMERCIAL

## 2022-09-21 ENCOUNTER — APPOINTMENT (OUTPATIENT)
Dept: WOUND CARE | Facility: HOSPITAL | Age: 57
End: 2022-09-21
Payer: COMMERCIAL

## 2022-09-21 VITALS
DIASTOLIC BLOOD PRESSURE: 90 MMHG | HEART RATE: 76 BPM | RESPIRATION RATE: 12 BRPM | TEMPERATURE: 98 F | SYSTOLIC BLOOD PRESSURE: 138 MMHG

## 2022-09-22 ENCOUNTER — APPOINTMENT (OUTPATIENT)
Dept: WOUND CARE | Facility: HOSPITAL | Age: 57
End: 2022-09-22
Payer: COMMERCIAL

## 2022-09-22 ENCOUNTER — HBO THERAPY VISIT (OUTPATIENT)
Dept: WOUND CARE | Facility: HOSPITAL | Age: 57
End: 2022-09-22
Attending: FAMILY MEDICINE
Payer: COMMERCIAL

## 2022-09-22 VITALS
TEMPERATURE: 98 F | DIASTOLIC BLOOD PRESSURE: 92 MMHG | RESPIRATION RATE: 12 BRPM | SYSTOLIC BLOOD PRESSURE: 146 MMHG | HEART RATE: 71 BPM

## 2022-09-22 PROCEDURE — 99183 HYPERBARIC OXYGEN THERAPY: CPT | Performed by: FAMILY MEDICINE

## 2022-09-23 ENCOUNTER — HBO THERAPY VISIT (OUTPATIENT)
Dept: WOUND CARE | Facility: HOSPITAL | Age: 57
End: 2022-09-23
Attending: INTERNAL MEDICINE
Payer: COMMERCIAL

## 2022-09-23 ENCOUNTER — APPOINTMENT (OUTPATIENT)
Dept: WOUND CARE | Facility: HOSPITAL | Age: 57
End: 2022-09-23
Payer: COMMERCIAL

## 2022-09-23 VITALS
RESPIRATION RATE: 16 BRPM | SYSTOLIC BLOOD PRESSURE: 153 MMHG | DIASTOLIC BLOOD PRESSURE: 94 MMHG | HEART RATE: 84 BPM | TEMPERATURE: 99 F

## 2022-09-26 ENCOUNTER — APPOINTMENT (OUTPATIENT)
Dept: WOUND CARE | Facility: HOSPITAL | Age: 57
End: 2022-09-26
Payer: COMMERCIAL

## 2022-09-27 ENCOUNTER — HBO THERAPY VISIT (OUTPATIENT)
Dept: WOUND CARE | Facility: HOSPITAL | Age: 57
End: 2022-09-27
Attending: FAMILY MEDICINE
Payer: COMMERCIAL

## 2022-09-27 ENCOUNTER — APPOINTMENT (OUTPATIENT)
Dept: WOUND CARE | Facility: HOSPITAL | Age: 57
End: 2022-09-27
Payer: COMMERCIAL

## 2022-09-27 VITALS
SYSTOLIC BLOOD PRESSURE: 157 MMHG | HEART RATE: 48 BPM | RESPIRATION RATE: 12 BRPM | TEMPERATURE: 97 F | DIASTOLIC BLOOD PRESSURE: 71 MMHG

## 2022-09-27 PROCEDURE — 99183 HYPERBARIC OXYGEN THERAPY: CPT | Performed by: FAMILY MEDICINE

## 2022-09-28 ENCOUNTER — HBO THERAPY VISIT (OUTPATIENT)
Dept: WOUND CARE | Facility: HOSPITAL | Age: 57
End: 2022-09-28
Attending: INTERNAL MEDICINE
Payer: COMMERCIAL

## 2022-09-28 ENCOUNTER — APPOINTMENT (OUTPATIENT)
Dept: WOUND CARE | Facility: HOSPITAL | Age: 57
End: 2022-09-28
Payer: COMMERCIAL

## 2022-09-28 VITALS
RESPIRATION RATE: 12 BRPM | SYSTOLIC BLOOD PRESSURE: 147 MMHG | DIASTOLIC BLOOD PRESSURE: 99 MMHG | TEMPERATURE: 98 F | HEART RATE: 79 BPM

## 2022-09-29 ENCOUNTER — APPOINTMENT (OUTPATIENT)
Dept: WOUND CARE | Facility: HOSPITAL | Age: 57
End: 2022-09-29
Payer: COMMERCIAL

## 2022-09-29 ENCOUNTER — HBO THERAPY VISIT (OUTPATIENT)
Dept: WOUND CARE | Facility: HOSPITAL | Age: 57
End: 2022-09-29
Attending: FAMILY MEDICINE
Payer: COMMERCIAL

## 2022-09-29 VITALS
TEMPERATURE: 98 F | HEART RATE: 76 BPM | RESPIRATION RATE: 10 BRPM | DIASTOLIC BLOOD PRESSURE: 89 MMHG | SYSTOLIC BLOOD PRESSURE: 141 MMHG

## 2022-09-29 PROCEDURE — 99183 HYPERBARIC OXYGEN THERAPY: CPT | Performed by: FAMILY MEDICINE

## 2022-09-30 ENCOUNTER — APPOINTMENT (OUTPATIENT)
Dept: WOUND CARE | Facility: HOSPITAL | Age: 57
End: 2022-09-30
Payer: COMMERCIAL

## 2022-09-30 ENCOUNTER — HBO THERAPY VISIT (OUTPATIENT)
Dept: WOUND CARE | Facility: HOSPITAL | Age: 57
End: 2022-09-30
Attending: FAMILY MEDICINE
Payer: COMMERCIAL

## 2022-09-30 VITALS
DIASTOLIC BLOOD PRESSURE: 87 MMHG | SYSTOLIC BLOOD PRESSURE: 137 MMHG | HEART RATE: 74 BPM | TEMPERATURE: 98 F | RESPIRATION RATE: 14 BRPM

## 2022-09-30 DIAGNOSIS — L59.8 SOFT TISSUE RADIONECROSIS: ICD-10-CM
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
## 2022-10-04 ENCOUNTER — HBO THERAPY VISIT (OUTPATIENT)
Dept: WOUND CARE | Facility: HOSPITAL | Age: 57
End: 2022-10-04
Attending: FAMILY MEDICINE
Payer: COMMERCIAL

## 2022-10-04 VITALS
SYSTOLIC BLOOD PRESSURE: 150 MMHG | TEMPERATURE: 97 F | HEART RATE: 72 BPM | RESPIRATION RATE: 10 BRPM | DIASTOLIC BLOOD PRESSURE: 89 MMHG

## 2022-10-05 ENCOUNTER — HBO THERAPY VISIT (OUTPATIENT)
Dept: WOUND CARE | Facility: HOSPITAL | Age: 57
End: 2022-10-05
Attending: INTERNAL MEDICINE
Payer: COMMERCIAL

## 2022-10-05 VITALS
HEART RATE: 86 BPM | RESPIRATION RATE: 14 BRPM | SYSTOLIC BLOOD PRESSURE: 136 MMHG | DIASTOLIC BLOOD PRESSURE: 86 MMHG | TEMPERATURE: 98 F
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
## 2022-10-05 DIAGNOSIS — L59.8 SOFT TISSUE RADIONECROSIS: ICD-10-CM

## 2022-10-06 ENCOUNTER — HBO THERAPY VISIT (OUTPATIENT)
Dept: WOUND CARE | Facility: HOSPITAL | Age: 57
End: 2022-10-06
Attending: FAMILY MEDICINE
Payer: COMMERCIAL

## 2022-10-06 VITALS
SYSTOLIC BLOOD PRESSURE: 137 MMHG | HEART RATE: 69 BPM | TEMPERATURE: 98 F | RESPIRATION RATE: 12 BRPM | DIASTOLIC BLOOD PRESSURE: 86 MMHG

## 2022-10-07 ENCOUNTER — HBO THERAPY VISIT (OUTPATIENT)
Dept: WOUND CARE | Facility: HOSPITAL | Age: 57
End: 2022-10-07
Attending: INTERNAL MEDICINE
Payer: COMMERCIAL

## 2022-10-07 VITALS
TEMPERATURE: 98 F | HEART RATE: 69 BPM | RESPIRATION RATE: 14 BRPM | SYSTOLIC BLOOD PRESSURE: 144 MMHG | DIASTOLIC BLOOD PRESSURE: 89 MMHG
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
## 2022-10-07 DIAGNOSIS — L59.8 SOFT TISSUE RADIONECROSIS: ICD-10-CM

## 2022-10-11 ENCOUNTER — HBO THERAPY VISIT (OUTPATIENT)
Dept: WOUND CARE | Facility: HOSPITAL | Age: 57
End: 2022-10-11
Attending: FAMILY MEDICINE
Payer: COMMERCIAL

## 2022-10-11 VITALS
DIASTOLIC BLOOD PRESSURE: 91 MMHG | TEMPERATURE: 97 F | SYSTOLIC BLOOD PRESSURE: 131 MMHG | HEART RATE: 75 BPM | RESPIRATION RATE: 10 BRPM

## 2022-10-11 PROCEDURE — 99183 HYPERBARIC OXYGEN THERAPY: CPT | Performed by: FAMILY MEDICINE

## 2022-11-03 ENCOUNTER — IMMUNIZATION (OUTPATIENT)
Dept: LAB | Age: 57
End: 2022-11-03
Attending: EMERGENCY MEDICINE
Payer: COMMERCIAL

## 2022-11-03 DIAGNOSIS — Z23 NEED FOR VACCINATION: Primary | ICD-10-CM

## 2022-11-03 PROCEDURE — 90686 IIV4 VACC NO PRSV 0.5 ML IM: CPT

## 2022-11-03 PROCEDURE — 90471 IMMUNIZATION ADMIN: CPT

## 2022-11-11 ENCOUNTER — IMMUNIZATION (OUTPATIENT)
Dept: LAB | Age: 57
End: 2022-11-11
Attending: EMERGENCY MEDICINE
Payer: COMMERCIAL

## 2022-11-11 DIAGNOSIS — Z23 NEED FOR VACCINATION: Primary | ICD-10-CM

## 2022-11-11 PROCEDURE — 0134A SARSCOV2 VAC BVL 50MCG/0.5ML: CPT

## 2022-11-17 ENCOUNTER — HOSPITAL ENCOUNTER (EMERGENCY)
Facility: HOSPITAL | Age: 57
Discharge: HOME | End: 2022-11-17
Attending: EMERGENCY MEDICINE
Payer: COMMERCIAL

## 2022-11-17 ENCOUNTER — APPOINTMENT (OUTPATIENT)
Dept: GENERAL RADIOLOGY | Age: 57
End: 2022-11-17
Attending: NURSE PRACTITIONER
Payer: COMMERCIAL

## 2022-11-17 ENCOUNTER — HOSPITAL ENCOUNTER (OUTPATIENT)
Age: 57
Discharge: ANOTHER HOSPITAL | End: 2022-11-17
Payer: COMMERCIAL

## 2022-11-17 ENCOUNTER — APPOINTMENT (OUTPATIENT)
Dept: CV DIAGNOSTICS | Facility: HOSPITAL | Age: 57
End: 2022-11-17
Attending: EMERGENCY MEDICINE
Payer: COMMERCIAL

## 2022-11-17 VITALS
OXYGEN SATURATION: 98 % | HEART RATE: 73 BPM | RESPIRATION RATE: 14 BRPM | TEMPERATURE: 98 F | WEIGHT: 229.25 LBS | BODY MASS INDEX: 33 KG/M2 | SYSTOLIC BLOOD PRESSURE: 137 MMHG | DIASTOLIC BLOOD PRESSURE: 82 MMHG

## 2022-11-17 VITALS
RESPIRATION RATE: 14 BRPM | TEMPERATURE: 98 F | SYSTOLIC BLOOD PRESSURE: 159 MMHG | HEART RATE: 78 BPM | DIASTOLIC BLOOD PRESSURE: 99 MMHG | OXYGEN SATURATION: 97 %

## 2022-11-17 DIAGNOSIS — R07.9 ACUTE CHEST PAIN: Primary | ICD-10-CM

## 2022-11-17 DIAGNOSIS — R07.89 CHEST PAIN, ATYPICAL: Primary | ICD-10-CM

## 2022-11-17 LAB
#MXD IC: 0.5 X10ˆ3/UL (ref 0.1–1)
ATRIAL RATE: 70 BPM
ATRIAL RATE: 73 BPM
BUN BLD-MCNC: 14 MG/DL (ref 7–18)
CHLORIDE BLD-SCNC: 100 MMOL/L (ref 98–112)
CO2 BLD-SCNC: 30 MMOL/L (ref 21–32)
CREAT BLD-MCNC: 0.8 MG/DL
DDIMER WHOLE BLOOD: <200 NG/ML DDU (ref ?–400)
GFR SERPLBLD BASED ON 1.73 SQ M-ARVRAT: 102 ML/MIN/1.73M2 (ref 60–?)
GLUCOSE BLD-MCNC: 104 MG/DL (ref 70–99)
HCT VFR BLD AUTO: 46.1 %
HCT VFR BLD CALC: 45 %
HGB BLD-MCNC: 14.5 G/DL
ISTAT IONIZED CALCIUM FOR CHEM 8: 1.25 MMOL/L (ref 1.12–1.32)
LYMPHOCYTES # BLD AUTO: 1.1 X10ˆ3/UL (ref 1–4)
LYMPHOCYTES NFR BLD AUTO: 18.6 %
MCH RBC QN AUTO: 28.4 PG (ref 26–34)
MCHC RBC AUTO-ENTMCNC: 31.5 G/DL (ref 31–37)
MCV RBC AUTO: 90.2 FL (ref 80–100)
MIXED CELL %: 9.3 %
NEUTROPHILS # BLD AUTO: 4.1 X10ˆ3/UL (ref 1.5–7.7)
NEUTROPHILS NFR BLD AUTO: 72.1 %
P AXIS: 21 DEGREES
P AXIS: 9 DEGREES
P-R INTERVAL: 138 MS
P-R INTERVAL: 146 MS
PLATELET # BLD AUTO: 196 X10ˆ3/UL (ref 150–450)
POTASSIUM BLD-SCNC: 4.7 MMOL/L (ref 3.6–5.1)
Q-T INTERVAL: 362 MS
Q-T INTERVAL: 382 MS
QRS DURATION: 86 MS
QRS DURATION: 94 MS
QTC CALCULATION (BEZET): 398 MS
QTC CALCULATION (BEZET): 412 MS
R AXIS: 20 DEGREES
R AXIS: 5 DEGREES
RBC # BLD AUTO: 5.11 X10ˆ6/UL
SARS-COV-2 RNA RESP QL NAA+PROBE: NOT DETECTED
SODIUM BLD-SCNC: 140 MMOL/L (ref 136–145)
T AXIS: 17 DEGREES
T AXIS: 3 DEGREES
TROPONIN I BLD-MCNC: <0.02 NG/ML
VENTRICULAR RATE: 70 BPM
VENTRICULAR RATE: 73 BPM
WBC # BLD AUTO: 5.7 X10ˆ3/UL (ref 4–11)

## 2022-11-17 PROCEDURE — 93018 CV STRESS TEST I&R ONLY: CPT | Performed by: EMERGENCY MEDICINE

## 2022-11-17 PROCEDURE — 99285 EMERGENCY DEPT VISIT HI MDM: CPT

## 2022-11-17 PROCEDURE — 99215 OFFICE O/P EST HI 40 MIN: CPT

## 2022-11-17 PROCEDURE — 80047 BASIC METABLC PNL IONIZED CA: CPT

## 2022-11-17 PROCEDURE — 93350 STRESS TTE ONLY: CPT | Performed by: EMERGENCY MEDICINE

## 2022-11-17 PROCEDURE — 93005 ELECTROCARDIOGRAM TRACING: CPT

## 2022-11-17 PROCEDURE — 36415 COLL VENOUS BLD VENIPUNCTURE: CPT

## 2022-11-17 PROCEDURE — 84484 ASSAY OF TROPONIN QUANT: CPT

## 2022-11-17 PROCEDURE — 93010 ELECTROCARDIOGRAM REPORT: CPT

## 2022-11-17 PROCEDURE — 93017 CV STRESS TEST TRACING ONLY: CPT | Performed by: EMERGENCY MEDICINE

## 2022-11-17 PROCEDURE — 71046 X-RAY EXAM CHEST 2 VIEWS: CPT | Performed by: NURSE PRACTITIONER

## 2022-11-17 PROCEDURE — 99214 OFFICE O/P EST MOD 30 MIN: CPT

## 2022-11-17 PROCEDURE — 85025 COMPLETE CBC W/AUTO DIFF WBC: CPT | Performed by: NURSE PRACTITIONER

## 2022-11-17 PROCEDURE — 85378 FIBRIN DEGRADE SEMIQUANT: CPT | Performed by: NURSE PRACTITIONER

## 2022-11-17 NOTE — ED PROVIDER NOTES
The patient arrived here from the urgent care with a complaint of having chest pain has been present over the last 3 days. It is nonexertional and seems to be constant throughout the day. He is denying any fevers, chills or shortness of breath. He arrived here with a initial negative D-dimer and negative troponin. My review of the patient's chart shows that he has had last November and nonobstructive 30% LAD lesion. The patient had a stress echocardiogram today that cardiology reported as normal and at this time the patient be discharged home for further outpatient care management. EKG    Rate, intervals and axes as noted on EKG Report.   Rate: 70  Rhythm: Sinus Rhythm  Reading: Agreed

## 2022-11-17 NOTE — ED NOTES
I reviewed that chart and discussed the case. I have examined the patient and noted          Patient complains of intermittent chest heaviness since Monday afternoon took 4 baby aspirin did get better. Denies any shortness of breath he states his wife when he was walking up the stairs stated to him that he was huffing and puffing, he presently has no complaints of chest pain, shortness of breath still he intermittently he has been having some chest pain especially since last last night he started having some more discomfort described as mid chest.  No associated shortness of breath nausea vomiting sweating or diaphoresis but does not radiate to his arms or neck. Does have history of some coronary disease he does have a angiogram that did show 30 to 40% blockage in LAD. Does have a family history, high cholesterol, low HDL, hypertension,. Family history of coronary disease, no diabetes, non-smoker. No recent travel no long trips no calf pain. General: Male nontoxic. The patient is in no respiratory distress    HEENT: There is no signs of trauma. Oral mucosa is wet. Lungs: Clear to auscultation without wheezing or retractions    Cardiovascular: Regular without murmurs    Extremities: Good pulse bilaterally. No calf tenderness s bilaterally. No calf tenderness    Neuro: Alert and oriented. The patient is moving all extremities there is no focal findings. High risk factors with elevated BMI,. Will get EKG, cardiac enzymes but will probably need to have a stress test.  Try to send more to the [facility] hospital for an outpatient stress test.    Assessment  Chest pain      I provided a substantive portion of care for this patient. I personally performed the medical decision making for this encounter.

## 2022-11-17 NOTE — ED NOTES
The APN from [address] will the patient will go directly to the main hospital to talk to the charge nurse the patient will need a stress echo if stress echo is negative patient can be discharged home. But at this present time with a high risk factors with his coronary disease in terms of coronary disease, high blood pressure high cholesterol family history of heart disease and obesity he we will need a stress test.  Presently pain-free.   Given asked he did take 4 baby aspirin couple hours ago

## 2022-11-17 NOTE — DISCHARGE INSTRUCTIONS
Please go directly to the main hospital emergency department.   Do not eat or drink anything on your way

## 2022-11-17 NOTE — ED INITIAL ASSESSMENT (HPI)
C/o chest heaviness since Monday afternoon. Took 4 ASA 81mg on Monday, Tuesday and today. Denies sob/nausea.

## 2022-11-23 NOTE — ED QUICK NOTES
I reviewed that chart and discussed the case. I have examined the patient and noted    The patient presents to the emergency room with complaints of chest pain. .  The patient has a history high cholesterol hypertension. He has been having intermittent chest pain. He is a nurse. He has a family history of heart disease a he does not smoke presently. He arrives here with these complaints of pain he did have some pain couple days ago resolved and he started having pain again today. General: Male nontoxic no respiratory distress. The patient is in no respiratory distress    HEENT: There is no signs of trauma. Oral mucosa is wet. Lungs: Clear to auscultation without wheezing or retractions    Cardiovascular: Regular without murmurs  Abdomen soft nontender. Extremities: Good pulses bilaterally. No calf tenderness. Neuro: Alert and oriented. The patient is moving all extremities there is no focal findings. D-dimer is negative    I-STAT negative. Troponins negative  CBC is normal        Assess  The patient has risk factors coronary disease we will get him over the stress echo was sent to the [facility] hospital for stress echo. Discussed this case with the charge nurse at [facility].              I provided a substantive portion of care for this patient. I personally performed the medical decision making for this encounter.

## 2023-01-13 ENCOUNTER — HOSPITAL ENCOUNTER (OUTPATIENT)
Age: 58
Discharge: HOME | End: 2023-01-13
Payer: COMMERCIAL

## 2023-01-13 VITALS
WEIGHT: 240 LBS | HEIGHT: 70 IN | TEMPERATURE: 98 F | SYSTOLIC BLOOD PRESSURE: 161 MMHG | RESPIRATION RATE: 20 BRPM | HEART RATE: 82 BPM | DIASTOLIC BLOOD PRESSURE: 87 MMHG | OXYGEN SATURATION: 98 % | BODY MASS INDEX: 34.36 KG/M2

## 2023-01-13 DIAGNOSIS — R30.0 DYSURIA: Primary | ICD-10-CM

## 2023-01-13 LAB
POCT BILIRUBIN URINE: NEGATIVE
POCT BLOOD URINE: NEGATIVE
POCT GLUCOSE URINE: NEGATIVE MG/DL
POCT LEUKOCYTE ESTERASE URINE: NEGATIVE
POCT NITRITE URINE: NEGATIVE
POCT PH URINE: 7 (ref 5–8)
POCT PROTEIN URINE: 30 MG/DL
POCT SPECIFIC GRAVITY URINE: 1.02
POCT URINE COLOR: YELLOW
POCT UROBILINOGEN URINE: 0.2 MG/DL

## 2023-01-13 PROCEDURE — 99213 OFFICE O/P EST LOW 20 MIN: CPT

## 2023-01-13 PROCEDURE — 81002 URINALYSIS NONAUTO W/O SCOPE: CPT | Performed by: EMERGENCY MEDICINE

## 2023-01-13 PROCEDURE — 87086 URINE CULTURE/COLONY COUNT: CPT | Performed by: PHYSICIAN ASSISTANT

## 2023-01-13 RX ORDER — PHENAZOPYRIDINE HYDROCHLORIDE 200 MG/1
200 TABLET, FILM COATED ORAL 3 TIMES DAILY PRN
Qty: 6 TABLET | Refills: 0 | Status: SHIPPED | OUTPATIENT
Start: 2023-01-13 | End: 2023-01-20

## 2023-01-13 RX ORDER — SULFAMETHOXAZOLE AND TRIMETHOPRIM 800; 160 MG/1; MG/1
1 TABLET ORAL 2 TIMES DAILY
Qty: 20 TABLET | Refills: 0 | Status: SHIPPED | OUTPATIENT
Start: 2023-01-13 | End: 2023-01-23

## 2023-01-13 NOTE — DISCHARGE INSTRUCTIONS
Please return to the Emergency department/clinic if symptoms worsen or you develop new symptoms. Follow up with your primary care physician in 2 days. Take any medications prescribed to you as instructed and complete any antibiotic prescription you begin.

## 2023-01-13 NOTE — ED INITIAL ASSESSMENT (HPI)
C/o uti symptoms. Pt reports uncomfortable urinating, urinary frequency, urethral spasms, burning. Pt has hx of frequency uti due to removal of prostate. Symptoms started this morning.

## 2023-01-31 ENCOUNTER — APPOINTMENT (OUTPATIENT)
Dept: CT IMAGING | Age: 58
End: 2023-01-31
Attending: STUDENT IN AN ORGANIZED HEALTH CARE EDUCATION/TRAINING PROGRAM
Payer: COMMERCIAL

## 2023-01-31 ENCOUNTER — HOSPITAL ENCOUNTER (OUTPATIENT)
Age: 58
Discharge: HOME | End: 2023-01-31
Attending: STUDENT IN AN ORGANIZED HEALTH CARE EDUCATION/TRAINING PROGRAM
Payer: COMMERCIAL

## 2023-01-31 VITALS
TEMPERATURE: 98 F | SYSTOLIC BLOOD PRESSURE: 150 MMHG | RESPIRATION RATE: 18 BRPM | OXYGEN SATURATION: 98 % | BODY MASS INDEX: 34 KG/M2 | WEIGHT: 240 LBS | DIASTOLIC BLOOD PRESSURE: 83 MMHG | HEART RATE: 75 BPM

## 2023-01-31 DIAGNOSIS — N39.0 URINARY TRACT INFECTION WITH HEMATURIA, SITE UNSPECIFIED: Primary | ICD-10-CM

## 2023-01-31 DIAGNOSIS — R31.9 URINARY TRACT INFECTION WITH HEMATURIA, SITE UNSPECIFIED: Primary | ICD-10-CM

## 2023-01-31 LAB
POCT BILIRUBIN URINE: NEGATIVE
POCT GLUCOSE URINE: NEGATIVE MG/DL
POCT KETONE URINE: NEGATIVE MG/DL
POCT LEUKOCYTE ESTERASE URINE: NEGATIVE
POCT NITRITE URINE: NEGATIVE
POCT PH URINE: 6.5 (ref 5–8)
POCT PROTEIN URINE: NEGATIVE MG/DL
POCT SPECIFIC GRAVITY URINE: 1.02
POCT URINE CLARITY: CLEAR
POCT URINE COLOR: YELLOW
POCT UROBILINOGEN URINE: 0.2 MG/DL

## 2023-01-31 PROCEDURE — 87086 URINE CULTURE/COLONY COUNT: CPT | Performed by: STUDENT IN AN ORGANIZED HEALTH CARE EDUCATION/TRAINING PROGRAM

## 2023-01-31 PROCEDURE — 74176 CT ABD & PELVIS W/O CONTRAST: CPT | Performed by: STUDENT IN AN ORGANIZED HEALTH CARE EDUCATION/TRAINING PROGRAM

## 2023-01-31 PROCEDURE — 99214 OFFICE O/P EST MOD 30 MIN: CPT

## 2023-01-31 PROCEDURE — 81002 URINALYSIS NONAUTO W/O SCOPE: CPT | Performed by: STUDENT IN AN ORGANIZED HEALTH CARE EDUCATION/TRAINING PROGRAM

## 2023-01-31 PROCEDURE — 96372 THER/PROPH/DIAG INJ SC/IM: CPT

## 2023-01-31 RX ORDER — CIPROFLOXACIN 500 MG/1
500 TABLET, FILM COATED ORAL 2 TIMES DAILY
Qty: 20 TABLET | Refills: 0 | Status: SHIPPED | OUTPATIENT
Start: 2023-01-31 | End: 2023-02-10

## 2023-01-31 RX ORDER — KETOROLAC TROMETHAMINE 30 MG/ML
30 INJECTION, SOLUTION INTRAMUSCULAR; INTRAVENOUS ONCE
Status: COMPLETED | OUTPATIENT
Start: 2023-01-31 | End: 2023-01-31

## 2023-07-18 ENCOUNTER — HOSPITAL ENCOUNTER (OUTPATIENT)
Age: 58
Discharge: HOME | End: 2023-07-18
Attending: EMERGENCY MEDICINE
Payer: COMMERCIAL

## 2023-07-18 VITALS
RESPIRATION RATE: 24 BRPM | BODY MASS INDEX: 37.22 KG/M2 | HEART RATE: 81 BPM | TEMPERATURE: 97 F | WEIGHT: 260 LBS | OXYGEN SATURATION: 98 % | SYSTOLIC BLOOD PRESSURE: 141 MMHG | DIASTOLIC BLOOD PRESSURE: 84 MMHG | HEIGHT: 70 IN

## 2023-07-18 DIAGNOSIS — R30.0 DYSURIA: Primary | ICD-10-CM

## 2023-07-18 LAB
POCT BILIRUBIN URINE: NEGATIVE
POCT GLUCOSE URINE: NEGATIVE MG/DL
POCT KETONE URINE: NEGATIVE MG/DL
POCT LEUKOCYTE ESTERASE URINE: NEGATIVE
POCT NITRITE URINE: NEGATIVE
POCT PH URINE: 8.5 (ref 5–8)
POCT PROTEIN URINE: 30 MG/DL
POCT SPECIFIC GRAVITY URINE: 1.01
POCT URINE CLARITY: CLEAR
POCT URINE COLOR: YELLOW
POCT UROBILINOGEN URINE: 1 MG/DL

## 2023-07-18 PROCEDURE — 87086 URINE CULTURE/COLONY COUNT: CPT | Performed by: EMERGENCY MEDICINE

## 2023-07-18 PROCEDURE — 99214 OFFICE O/P EST MOD 30 MIN: CPT

## 2023-07-18 PROCEDURE — 81002 URINALYSIS NONAUTO W/O SCOPE: CPT | Performed by: EMERGENCY MEDICINE

## 2023-07-18 RX ORDER — NITROFURANTOIN 25; 75 MG/1; MG/1
100 CAPSULE ORAL 2 TIMES DAILY
Qty: 14 CAPSULE | Refills: 0 | Status: SHIPPED | OUTPATIENT
Start: 2023-07-18 | End: 2023-07-25

## 2023-07-18 NOTE — DISCHARGE INSTRUCTIONS
Start Deja Castillo pending urine culture  Go to the emergency room if worse  Follow-up with your primary care physician 2 days EKG performed

## 2023-07-18 NOTE — ED INITIAL ASSESSMENT (HPI)
C/o urinary symptoms starting today. Pt reports burning during urination, urinary frequency/retention, penile pain. Pt reports hx of complicated uti and prostate CA. Pt denies otc meds used for symptoms. Pt denies blood in urine, fever, chills, foul odor.

## 2023-07-28 ENCOUNTER — HOSPITAL ENCOUNTER (OUTPATIENT)
Age: 58
Discharge: HOME | End: 2023-07-28
Attending: EMERGENCY MEDICINE
Payer: COMMERCIAL

## 2023-07-28 VITALS
TEMPERATURE: 100 F | HEART RATE: 94 BPM | HEIGHT: 70 IN | RESPIRATION RATE: 20 BRPM | DIASTOLIC BLOOD PRESSURE: 86 MMHG | SYSTOLIC BLOOD PRESSURE: 156 MMHG | BODY MASS INDEX: 37.22 KG/M2 | WEIGHT: 260 LBS | OXYGEN SATURATION: 97 %

## 2023-07-28 DIAGNOSIS — B34.9 VIRAL SYNDROME: Primary | ICD-10-CM

## 2023-07-28 DIAGNOSIS — R19.7 DIARRHEA, UNSPECIFIED TYPE: ICD-10-CM

## 2023-07-28 LAB
POCT BILIRUBIN URINE: NEGATIVE
POCT GLUCOSE URINE: NEGATIVE MG/DL
POCT KETONE URINE: NEGATIVE MG/DL
POCT LEUKOCYTE ESTERASE URINE: NEGATIVE
POCT NITRITE URINE: NEGATIVE
POCT PH URINE: 6 (ref 5–8)
POCT PROTEIN URINE: NEGATIVE MG/DL
POCT SPECIFIC GRAVITY URINE: 1
POCT URINE CLARITY: CLEAR
POCT URINE COLOR: YELLOW
POCT UROBILINOGEN URINE: 0.2 MG/DL
SARS-COV-2 RNA RESP QL NAA+PROBE: NOT DETECTED

## 2023-07-28 PROCEDURE — 81002 URINALYSIS NONAUTO W/O SCOPE: CPT | Performed by: EMERGENCY MEDICINE

## 2023-07-28 PROCEDURE — 99212 OFFICE O/P EST SF 10 MIN: CPT

## 2023-07-28 PROCEDURE — 99213 OFFICE O/P EST LOW 20 MIN: CPT

## 2023-07-28 NOTE — ED INITIAL ASSESSMENT (HPI)
Diarrhea- started yesterday  730 pm watery stool  5x, today 2x  Took imodium yesterday   2x. None today. Also c/o cramping pain intermittent. Denies nausea or vomiting  Fever- temp 101.8 at 11:30 pm  bodyaches, chills, headache. Took ibuprofen.  Tylenol  3 am, advil 800 mg at 8 am

## 2023-07-29 ENCOUNTER — LAB ENCOUNTER (OUTPATIENT)
Dept: LAB | Age: 58
End: 2023-07-29
Attending: EMERGENCY MEDICINE
Payer: COMMERCIAL

## 2023-07-29 DIAGNOSIS — R19.7 DIARRHEA, UNSPECIFIED TYPE: ICD-10-CM

## 2023-07-29 PROCEDURE — 87493 C DIFF AMPLIFIED PROBE: CPT

## 2023-07-29 PROCEDURE — 82272 OCCULT BLD FECES 1-3 TESTS: CPT

## 2023-07-29 PROCEDURE — 87045 FECES CULTURE AEROBIC BACT: CPT

## 2023-07-29 PROCEDURE — 87427 SHIGA-LIKE TOXIN AG IA: CPT

## 2023-07-29 PROCEDURE — 87046 STOOL CULTR AEROBIC BACT EA: CPT

## 2023-07-31 LAB — C DIFF TOX B STL QL: NEGATIVE

## 2023-10-11 NOTE — PROGRESS NOTES
Pre/Post Treatment Evaluation      Pre-Treatment Lung Evaluation: Clear to auscultation bilaterally    Left Ear Pre-Treatment Evaluation  Left Ear Clear: Yes  Left Ear Intact: Yes  Left Cerumen Removal: No  Pre Left teed stgstrstastdstest:st st1st grade    Right Ear Pre-Treatment Evaluation  Right Ear Clear: Yes  Right Ear Intact: Yes  Right Cerumen Removal: No  Pre Right teed stgstrstastdstest:st st1st grade      Post-Treatment Lung Evaluation: Clear to auscultation bilaterally    Left Ear Post-Treatment Evaluation  Left Ear Clear: Yes  Left Ear Intact: Yes  Left Cerumen Removal: No  Post Left teed stgstrstastdstest:st st1st grade    Right Ear Post-Treatment Evaluation  Right Ear Clear: Yes  Right Ear Intact: Yes  Right Cerumen Removal: No  Post Right teed stgstrstastdstest:st st1st grade      I supervised this Hyperbaric treatment and was immediately available throughout the course of the treatment. There is a trained emergency support team and ICU available at this facility to assist with complications. Secondary to aspiration pneumonia and altered sensorium from alcohol withdrawal  Intubated October 9, subsequently extubated October 10  Currently on nasal cannula O2  Continue O2 and RT protocols  Mobilize  I-S

## 2023-11-10 ENCOUNTER — IMMUNIZATION (OUTPATIENT)
Dept: LAB | Age: 58
End: 2023-11-10
Attending: EMERGENCY MEDICINE
Payer: COMMERCIAL

## 2023-11-10 DIAGNOSIS — Z23 NEED FOR VACCINATION: Primary | ICD-10-CM

## 2023-11-10 PROCEDURE — 90480 ADMN SARSCOV2 VAC 1/ONLY CMP: CPT

## 2023-11-13 ENCOUNTER — IMMUNIZATION (OUTPATIENT)
Dept: LAB | Age: 58
End: 2023-11-13
Attending: EMERGENCY MEDICINE
Payer: COMMERCIAL

## 2023-11-13 DIAGNOSIS — Z23 NEED FOR VACCINATION: Primary | ICD-10-CM

## 2023-11-13 PROCEDURE — 90686 IIV4 VACC NO PRSV 0.5 ML IM: CPT

## 2023-11-13 PROCEDURE — 90471 IMMUNIZATION ADMIN: CPT

## 2024-01-27 ENCOUNTER — APPOINTMENT (OUTPATIENT)
Dept: GENERAL RADIOLOGY | Age: 59
End: 2024-01-27
Attending: EMERGENCY MEDICINE
Payer: COMMERCIAL

## 2024-01-27 ENCOUNTER — HOSPITAL ENCOUNTER (EMERGENCY)
Age: 59
Discharge: HOME | End: 2024-01-27
Attending: EMERGENCY MEDICINE
Payer: COMMERCIAL

## 2024-01-27 VITALS
DIASTOLIC BLOOD PRESSURE: 96 MMHG | HEART RATE: 102 BPM | SYSTOLIC BLOOD PRESSURE: 164 MMHG | HEIGHT: 70 IN | BODY MASS INDEX: 37.22 KG/M2 | RESPIRATION RATE: 20 BRPM | WEIGHT: 260 LBS | OXYGEN SATURATION: 96 % | TEMPERATURE: 100 F

## 2024-01-27 DIAGNOSIS — J11.1 INFLUENZA: Primary | ICD-10-CM

## 2024-01-27 LAB
ALBUMIN SERPL-MCNC: 3.7 G/DL (ref 3.4–5)
ALBUMIN/GLOB SERPL: 1.2 {RATIO} (ref 1–2)
ALP LIVER SERPL-CCNC: 70 U/L
ANION GAP SERPL CALC-SCNC: 6 MMOL/L (ref 0–18)
AST SERPL-CCNC: 32 U/L (ref 15–37)
BASOPHILS # BLD AUTO: 0.03 X10(3) UL (ref 0–0.2)
BASOPHILS NFR BLD AUTO: 0.4 %
BILIRUB SERPL-MCNC: 0.5 MG/DL (ref 0.1–2)
BUN BLD-MCNC: 9 MG/DL (ref 9–23)
CALCIUM BLD-MCNC: 8.4 MG/DL (ref 8.5–10.1)
CHLORIDE SERPL-SCNC: 104 MMOL/L (ref 98–112)
CO2 SERPL-SCNC: 26 MMOL/L (ref 21–32)
CREAT BLD-MCNC: 0.82 MG/DL
EGFRCR SERPLBLD CKD-EPI 2021: 100 ML/MIN/1.73M2 (ref 60–?)
EOSINOPHIL # BLD AUTO: 0.03 X10(3) UL (ref 0–0.7)
EOSINOPHIL NFR BLD AUTO: 0.4 %
ERYTHROCYTE [DISTWIDTH] IN BLOOD BY AUTOMATED COUNT: 12.5 %
GLOBULIN PLAS-MCNC: 3.2 G/DL (ref 2.8–4.4)
GLUCOSE BLD-MCNC: 128 MG/DL (ref 70–99)
HCT VFR BLD AUTO: 49.5 %
HGB BLD-MCNC: 16.9 G/DL
IMM GRANULOCYTES # BLD AUTO: 0.02 X10(3) UL (ref 0–1)
IMM GRANULOCYTES NFR BLD: 0.3 %
LYMPHOCYTES # BLD AUTO: 0.45 X10(3) UL (ref 1–4)
LYMPHOCYTES NFR BLD AUTO: 6.6 %
MCH RBC QN AUTO: 29.8 PG (ref 26–34)
MCHC RBC AUTO-ENTMCNC: 34.1 G/DL (ref 31–37)
MCV RBC AUTO: 87.3 FL
MONOCYTES # BLD AUTO: 0.85 X10(3) UL (ref 0.1–1)
MONOCYTES NFR BLD AUTO: 12.5 %
NEUTROPHILS # BLD AUTO: 5.42 X10 (3) UL (ref 1.5–7.7)
NEUTROPHILS # BLD AUTO: 5.42 X10(3) UL (ref 1.5–7.7)
NEUTROPHILS NFR BLD AUTO: 79.8 %
OSMOLALITY SERPL CALC.SUM OF ELEC: 282 MOSM/KG (ref 275–295)
PLATELET # BLD AUTO: 129 10(3)UL (ref 150–450)
POCT INFLUENZA A: POSITIVE
POCT INFLUENZA B: NEGATIVE
POTASSIUM SERPL-SCNC: 4 MMOL/L (ref 3.5–5.1)
PROT SERPL-MCNC: 6.9 G/DL (ref 6.4–8.2)
RBC # BLD AUTO: 5.67 X10(6)UL
SARS-COV-2 RNA RESP QL NAA+PROBE: NOT DETECTED
SODIUM SERPL-SCNC: 136 MMOL/L (ref 136–145)
TROPONIN I SERPL HS-MCNC: 8 NG/L
WBC # BLD AUTO: 6.8 X10(3) UL (ref 4–11)

## 2024-01-27 PROCEDURE — 99284 EMERGENCY DEPT VISIT MOD MDM: CPT

## 2024-01-27 PROCEDURE — 80053 COMPREHEN METABOLIC PANEL: CPT | Performed by: EMERGENCY MEDICINE

## 2024-01-27 PROCEDURE — 36415 COLL VENOUS BLD VENIPUNCTURE: CPT

## 2024-01-27 PROCEDURE — 85025 COMPLETE CBC W/AUTO DIFF WBC: CPT | Performed by: EMERGENCY MEDICINE

## 2024-01-27 PROCEDURE — 93005 ELECTROCARDIOGRAM TRACING: CPT

## 2024-01-27 PROCEDURE — 87502 INFLUENZA DNA AMP PROBE: CPT | Performed by: EMERGENCY MEDICINE

## 2024-01-27 PROCEDURE — 71046 X-RAY EXAM CHEST 2 VIEWS: CPT | Performed by: EMERGENCY MEDICINE

## 2024-01-27 PROCEDURE — 93010 ELECTROCARDIOGRAM REPORT: CPT

## 2024-01-27 PROCEDURE — 84484 ASSAY OF TROPONIN QUANT: CPT | Performed by: EMERGENCY MEDICINE

## 2024-01-27 RX ORDER — OSELTAMIVIR PHOSPHATE 75 MG/1
75 CAPSULE ORAL 2 TIMES DAILY
Qty: 10 CAPSULE | Refills: 0 | Status: SHIPPED | OUTPATIENT
Start: 2024-01-27 | End: 2024-02-01

## 2024-01-27 RX ORDER — IBUPROFEN 400 MG/1
400 TABLET ORAL ONCE
Status: COMPLETED | OUTPATIENT
Start: 2024-01-27 | End: 2024-01-27

## 2024-01-27 NOTE — ED INITIAL ASSESSMENT (HPI)
Pt to ed with c/o cough, diarrhea and fever x 2 days. Today with substernal CP and SOB . PT recently flew back from [place] on Thursday. Last dose of Tylenol at 15:00

## 2024-01-27 NOTE — DISCHARGE INSTRUCTIONS
Breast  Quarantine at home and you are feeling better  Ibuprofen for fever May alternate with Tylenol  Push fluids  Return if worse  Blood with your primary care physician 1 week

## 2024-01-27 NOTE — ED PROVIDER NOTES
Patient Seen in: [facility] In [place]      History     Chief Complaint   Patient presents with    Nausea/Vomiting/Diarrhea     Pt endorses cough, diarrhea and fever x 2 days, afebrile today at 98.6. Denies vomiting/sick contacts. Recently in [place], returned Thursday with symptoms. At home COVID negative x 2.      Stated Complaint: Diarrhea/fever    Subjective:   HPI    This is a 59-year-old male with past medical history of mast cell disease, hypertension, obesity, prostatectomy, coronary artery disease states that he was not feeling well this week.  He recently turned from a vacation from [place] on Thursday.  On Thursday he started experiencing mast cell release.  He became very flushed.  He had diarrhea.  He developed a fever.  He took Benadryl Pepcid and prednisone and started feeling better.  On Friday had temperature 100.7.  He had some diarrhea.  That seems to resolved.  He had a Tmax of 101.7 today.  He had a cough, chest tightness.  Myalgias.  No vomiting.  d.  No obvious sick contacts.  Has been immunized for flu and COVID this year.  He does not smoke tobacco.  He rarely drinks alcohol.  Patient does report to me that he had a 40% blockage in one of his coronary arteries that they are monitoring.  He has no stents.  He has never had a STEMI.here for further evaluation.        Objective:   Past Medical History:   Diagnosis Date    Cancer (HCC)     prostate    Essential hypertension     Hyperlipidemia     Visual impairment     glasses              Past Surgical History:   Procedure Laterality Date    LAPAROSCOPY, SURGICAL PROSTATECTOMY, RETROPUBIC RADICAL, W/NERVE SPARING  04/01/2019                Social History     Socioeconomic History    Marital status:    Tobacco Use    Smoking status: Never     Passive exposure: Never    Smokeless tobacco: Never   Vaping Use    Vaping Use: Never used   Substance and Sexual Activity    Alcohol use: Not Currently     Alcohol/week: 2.0  standard drinks of alcohol     Types: 2 Glasses of wine per week    Drug use: Never              Review of Systems    Positive for stated complaint: Diarrhea/fever  Other systems are as noted in HPI.  Constitutional and vital signs reviewed.      All other systems reviewed and negative except as noted above.    Physical Exam     ED Triage Vitals [01/27/24 1651]   BP (!) 164/96   Pulse 102   Resp 20   Temp 100.3 °F (37.9 °C)   Temp src Temporal   SpO2 96 %   O2 Device None (Room air)       Current:BP (!) 164/96   Pulse 102   Temp 100.3 °F (37.9 °C) (Temporal)   Resp 20   Ht 177.8 cm (5' 10\")   Wt 117.9 kg   SpO2 96%   BMI 37.31 kg/m²         Physical Exam    GENERAL: Awake, alert oriented x3, nontoxic appearing.   SKIN: Normal, warm, and dry.  HEENT:  Pupils equally round and reactive to light. Conjuctiva clear.  Oropharynx is clear and moist.   Lungs: Clear to auscultation bilaterally with no rales, no retractions, and no wheezing.  HEART:  Regular rate and rhythm. S1 and S2. No murmurs, no rubs or gallops.   ABDOMEN: Soft, nontender and nondistended. Normoactive bowel sounds. No rebound. No guarding.   EXTREMITIES: Warm with brisk capillary refill.         ED Course     Labs Reviewed   COMP METABOLIC PANEL (14) - Abnormal; Notable for the following components:       Result Value    Glucose 128 (*)     Calcium, Total 8.4 (*)     All other components within normal limits   POCT FLU TEST - Abnormal; Notable for the following components:    POCT INFLUENZA A Positive (*)     All other components within normal limits    Narrative:     This assay is a rapid molecular in vitro test utilizing nucleic acid amplification of influenza A and B viral RNA.   CBC W/ DIFFERENTIAL - Abnormal; Notable for the following components:    .0 (*)     Lymphocyte Absolute 0.45 (*)     All other components within normal limits   TROPONIN I HIGH SENSITIVITY - Normal   RAPID SARS-COV-2 BY PCR - Normal   CBC WITH DIFFERENTIAL WITH  PLATELET    Narrative:     The following orders were created for panel order CBC With Differential With Platelet.  Procedure                               Abnormality         Status                     ---------                               -----------         ------                     CBC W/ DIFFERENTIAL[[id number]]          Abnormal            Final result                 Please view results for these tests on the individual orders.     EKG    Rate, intervals and axes as noted on EKG Report.  Rate: 94  Rhythm: Sinus Rhythm  Reading: Nonspecific ST changes                 XR CHEST PA + LAT CHEST (CPT=71046)    Result Date: 1/27/2024  PROCEDURE:  XR CHEST PA + LAT CHEST (CPT=71046)  INDICATIONS:  Diarrhea/fever  COMPARISON:  [facility], XR, XR CHEST PA + LAT CHEST (KOH=[id number]), 11/17/2022, 11:06 AM.  TECHNIQUE:  PA and lateral chest radiographs were obtained.  PATIENT STATED HISTORY: (As transcribed by Technologist)  Patient states cough and fever for 2 days.    FINDINGS:  Heart size is within normal limits.  Pleural spaces appear clear.  Mediastinal and hilar contours are normal.  No focal consolidation.  If clinical symptoms persist then recommend follow-up imaging including CT.            CONCLUSION:  See above.   LOCATION:  [facility]   Dictated by (CST): Kurt Simmons MD on 1/27/2024 at 5:11 PM     Finalized by (CST): Kurt Simmons MD on 1/27/2024 at 5:13 PM              MDM        This is a 59-year-old male with past medical history of mast cell disease, hypertension, obesity, prostatectomy, coronary artery disease states that he was not feeling well this week.  Differential includes viral syndrome, flu/COVID/pneumonia.      Patient placed on cardiac monitor, continuous pulse oximetry and IV line was established of normal saline.  Patient was given ibuprofen.  Basic labs were obtained.  CBC: White blood cell count 6.8.  Hemoglobin 16.9.  Platelet 129.  CMP: BUN 9.  Creatinine 0.8.  Glucose 128.  Bicarb 26.  Troponin  negative.    Influenza A positive    I independently viewed the chest x-ray which showed no evidence of pneumonia.  I also reviewed the radiology interpretation as above.        Disposition and Plan     Clinical Impression:  1. Influenza         Disposition:  Discharge  1/27/2024  5:49 pm    Follow-up:  Wyatt Muniz  [address] [place] [id number] [phone]    Follow up in 1 week(s)            Medications Prescribed:  Current Discharge Medication List

## 2024-01-28 LAB
ATRIAL RATE: 94 BPM
P AXIS: 49 DEGREES
P-R INTERVAL: 144 MS
Q-T INTERVAL: 320 MS
QRS DURATION: 88 MS
QTC CALCULATION (BEZET): 400 MS
R AXIS: 4 DEGREES
T AXIS: 31 DEGREES
VENTRICULAR RATE: 94 BPM

## 2024-02-13 ENCOUNTER — HOSPITAL ENCOUNTER (OUTPATIENT)
Age: 59
Discharge: HOME | End: 2024-02-13
Payer: COMMERCIAL

## 2024-02-13 VITALS
TEMPERATURE: 98 F | WEIGHT: 260 LBS | DIASTOLIC BLOOD PRESSURE: 99 MMHG | HEART RATE: 68 BPM | HEIGHT: 71 IN | OXYGEN SATURATION: 97 % | RESPIRATION RATE: 18 BRPM | BODY MASS INDEX: 36.4 KG/M2 | SYSTOLIC BLOOD PRESSURE: 155 MMHG

## 2024-02-13 DIAGNOSIS — H65.91 OTITIS MEDIA WITH EFFUSION, RIGHT: Primary | ICD-10-CM

## 2024-02-13 PROCEDURE — 99213 OFFICE O/P EST LOW 20 MIN: CPT

## 2024-02-13 RX ORDER — AMOXICILLIN AND CLAVULANATE POTASSIUM 875; 125 MG/1; MG/1
1 TABLET, FILM COATED ORAL 2 TIMES DAILY
Qty: 14 TABLET | Refills: 0 | Status: SHIPPED | OUTPATIENT
Start: 2024-02-13 | End: 2024-02-20

## 2024-02-13 NOTE — DISCHARGE INSTRUCTIONS
Try antihistamines for your symptoms.    Amoxicillin for persistent symptoms.    Follow up with ENT.

## 2024-02-13 NOTE — ED PROVIDER NOTES
Patient Seen in: [facility]      History     Chief Complaint   Patient presents with    Ear Pain     Stated Complaint: ear pain    Subjective:   HPI    59-year-old male presents to the IC with right ear pain for 3 weeks.  Denies any cough or congestions.  No fever.  No drainage.  No hearing loss.    Objective:   Past Medical History:   Diagnosis Date    Cancer (HCC)     prostate    Essential hypertension     Hyperlipidemia     Visual impairment     glasses              Past Surgical History:   Procedure Laterality Date    LAPAROSCOPY, SURGICAL PROSTATECTOMY, RETROPUBIC RADICAL, W/NERVE SPARING  04/01/2019                Social History     Socioeconomic History    Marital status:    Tobacco Use    Smoking status: Never     Passive exposure: Never    Smokeless tobacco: Never   Vaping Use    Vaping Use: Never used   Substance and Sexual Activity    Alcohol use: Not Currently     Alcohol/week: 2.0 standard drinks of alcohol     Types: 2 Glasses of wine per week    Drug use: Never              Review of Systems    Positive for stated complaint: ear pain  Other systems are as noted in HPI.  Constitutional and vital signs reviewed.      All other systems reviewed and negative except as noted above.    Physical Exam     ED Triage Vitals [02/13/24 0907]   BP (!) 155/99   Pulse 68   Resp 18   Temp 97.7 °F (36.5 °C)   Temp src Temporal   SpO2 97 %   O2 Device None (Room air)       Current:BP (!) 155/99   Pulse 68   Temp 97.7 °F (36.5 °C) (Temporal)   Resp 18   Ht 180.3 cm (5' 11\")   Wt 117.9 kg   SpO2 97%   BMI 36.26 kg/m²         Physical Exam  Vitals and nursing note reviewed.   Constitutional:       Appearance: He is well-developed.   HENT:      Head: Atraumatic.      Right Ear: External ear normal. A middle ear effusion is present. Tympanic membrane is not erythematous, retracted or bulging.      Left Ear: External ear normal.      Nose: Nose normal.      Mouth/Throat:      Mouth: Mucous  membranes are moist.      Pharynx: Oropharynx is clear.   Eyes:      Conjunctiva/sclera: Conjunctivae normal.   Cardiovascular:      Rate and Rhythm: Normal rate and regular rhythm.   Pulmonary:      Effort: Pulmonary effort is normal.      Breath sounds: Normal breath sounds.   Musculoskeletal:      Cervical back: Normal range of motion and neck supple.   Skin:     General: Skin is warm and dry.      Capillary Refill: Capillary refill takes less than 2 seconds.   Neurological:      Mental Status: He is alert and oriented to person, place, and time.   Psychiatric:         Mood and Affect: Mood normal.               ED Course   Labs Reviewed - No data to display                   MDM      59-year-old male presents with right ear pain for 3 weeks.  No other symptoms reported.    Differential diagnosis includes: Otitis media, otitis externa, eustachian tube dysfunction    Middle ear effusion noted on exam.  Advised use of antihistamines over the counter for symptomatic relief.  Discussed use of amoxicillin, patient would like to defer antibiotic treatment as it causes severe GI side effects with him.  Rx given, patient was advised to use antihistamines first.  Follow-up with ENT.  He is agreeable to this plan.                                   Medical Decision Making      Disposition and Plan     Clinical Impression:  1. Otitis media with effusion, right         Disposition:  Discharge  2/13/2024  9:35 am    Follow-up:  Armando Rich MD  [facility] [id number] [phone]                Medications Prescribed:  Discharge Medication List as of 2/13/2024  9:43 AM        START taking these medications    Details   amoxicillin clavulanate 875-125 MG Oral Tab Take 1 tablet by mouth 2 (two) times daily for 7 days., Normal, Disp-14 tablet, R-0

## 2024-04-30 ENCOUNTER — HOSPITAL ENCOUNTER (OUTPATIENT)
Age: 59
Discharge: HOME | End: 2024-04-30
Payer: COMMERCIAL

## 2024-04-30 ENCOUNTER — APPOINTMENT (OUTPATIENT)
Dept: GENERAL RADIOLOGY | Age: 59
End: 2024-04-30
Attending: NURSE PRACTITIONER
Payer: COMMERCIAL

## 2024-04-30 VITALS
RESPIRATION RATE: 18 BRPM | BODY MASS INDEX: 37.22 KG/M2 | WEIGHT: 260 LBS | TEMPERATURE: 98 F | HEART RATE: 71 BPM | HEIGHT: 70 IN | OXYGEN SATURATION: 97 % | SYSTOLIC BLOOD PRESSURE: 147 MMHG | DIASTOLIC BLOOD PRESSURE: 85 MMHG

## 2024-04-30 DIAGNOSIS — S23.41XA SPRAIN OF COSTAL CARTILAGE, INITIAL ENCOUNTER: ICD-10-CM

## 2024-04-30 DIAGNOSIS — T14.8XXA ABRASION: Primary | ICD-10-CM

## 2024-04-30 DIAGNOSIS — R07.81 RIB PAIN: ICD-10-CM
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
## 2024-04-30 PROCEDURE — 99213 OFFICE O/P EST LOW 20 MIN: CPT

## 2024-04-30 PROCEDURE — 71101 X-RAY EXAM UNILAT RIBS/CHEST: CPT | Performed by: NURSE PRACTITIONER

## 2024-04-30 PROCEDURE — 90471 IMMUNIZATION ADMIN: CPT

## 2024-04-30 RX ORDER — NAPROXEN 500 MG/1
500 TABLET ORAL 2 TIMES DAILY PRN
Qty: 20 TABLET | Refills: 0 | Status: SHIPPED | OUTPATIENT
Start: 2024-04-30 | End: 2024-05-07

## 2024-04-30 RX ORDER — CYCLOBENZAPRINE HCL 10 MG
10 TABLET ORAL 3 TIMES DAILY PRN
Qty: 20 TABLET | Refills: 0 | Status: SHIPPED | OUTPATIENT
Start: 2024-04-30 | End: 2024-05-07

## 2024-04-30 NOTE — ED PROVIDER NOTES
Patient Seen in: [facility]      History     Chief Complaint   Patient presents with    Fall     Stated Complaint: fall; rib pain, leg wound    Subjective:   HPI    59-year-old male with hypertension presents today with complaints of left-sided rib pain and left knee abrasion after he tripped over a curb and fell into the mulch on his left side.  Patient states he was having pain overnight while he was sleeping while he was lying on his left side.  Patient denies any shortness of breath associated with the rib pain.  Patient states he is unsure if he is up-to-date on his tetanus vaccination.    Objective:   Past Medical History:    Cancer (HCC)    prostate    Essential hypertension    Hyperlipidemia    Visual impairment    glasses              Past Surgical History:   Procedure Laterality Date    Laparoscopy, surgical prostatectomy, retropubic radical, w/nerve sparing  04/01/2019                Social History     Socioeconomic History    Marital status:    Tobacco Use    Smoking status: Never     Passive exposure: Never    Smokeless tobacco: Never   Vaping Use    Vaping status: Never Used   Substance and Sexual Activity    Alcohol use: Not Currently     Alcohol/week: 2.0 standard drinks of alcohol     Types: 2 Glasses of wine per week    Drug use: Never              Review of Systems   Constitutional: Negative.    HENT: Negative.     Eyes: Negative.    Respiratory: Negative.     Cardiovascular:  Positive for chest pain.        Left-sided rib pain with palpation.     Gastrointestinal: Negative.    Endocrine: Negative.    Genitourinary: Negative.    Musculoskeletal: Negative.    Skin: Negative.    Allergic/Immunologic: Negative.    Neurological: Negative.    Hematological: Negative.    Psychiatric/Behavioral: Negative.         Positive for stated complaint: fall; rib pain, leg wound  Other systems are as noted in HPI.  Constitutional and vital signs reviewed.      All other systems reviewed  and negative except as noted above.    Physical Exam     ED Triage Vitals [04/30/24 1218]   /85   Pulse 71   Resp 18   Temp 97.7 °F (36.5 °C)   Temp src Temporal   SpO2 97 %   O2 Device None (Room air)       Current:/85   Pulse 71   Temp 97.7 °F (36.5 °C) (Temporal)   Resp 18   Ht 177.8 cm (5' 10\")   Wt 117.9 kg   SpO2 97%   BMI 37.31 kg/m²         Physical Exam  Vitals and nursing note reviewed.   Constitutional:       Appearance: Normal appearance.   HENT:      Head: Normocephalic.      Right Ear: External ear normal.      Left Ear: External ear normal.   Eyes:      Extraocular Movements: Extraocular movements intact.      Conjunctiva/sclera: Conjunctivae normal.      Pupils: Pupils are equal, round, and reactive to light.   Cardiovascular:      Rate and Rhythm: Normal rate and regular rhythm.      Pulses: Normal pulses.      Heart sounds: Normal heart sounds.      Comments: Tender to palpation over the left anterior rib region.  No flailing appreciated.  No deformity noted.  Pulmonary:      Effort: Pulmonary effort is normal.      Breath sounds: Normal breath sounds.   Abdominal:      General: Abdomen is flat. Bowel sounds are normal.      Palpations: Abdomen is soft.   Musculoskeletal:      Cervical back: Normal range of motion and neck supple.   Skin:     General: Skin is warm.      Findings: Lesion present.      Comments: Abrasion noted to the left lateral knee.   Neurological:      General: No focal deficit present.      Mental Status: He is alert.   Psychiatric:         Mood and Affect: Mood normal.             ED Course   Labs Reviewed - No data to display                   MDM      59-year-old male with hypertension presents today with complaints of left-sided rib pain and left knee abrasion after he tripped over a curb and fell into the mulch on his left side.  Patient states he was having pain overnight while he was sleeping while he was lying on his left side.  Patient denies any  shortness of breath associated with the rib pain.  Patient states he is unsure if he is up-to-date on his tetanus vaccination.  VS: See vitals.  Physical exam: See exam.  Differential diagnosis: Fracture, rib sprain, abrasion, fall.  XR RIBS WITH CHEST (3 VIEWS), LEFT  (CPT=71101)    Result Date: 4/30/2024  CONCLUSION:   Stable cardiac and mediastinal contours.  The lungs and pleural spaces are clear.  No displaced rib fracture or other acute osseous findings.   LOCATION:  [facility]     Dictated by (CST): Leobardo Villa MD on 4/30/2024 at 12:57 PM     Finalized by (CST): Leobardo Villa MD on 4/30/2024 at 1:02 PM      Will diagnosed with rib sprain.  Will prescribe naproxen incentive spirometer and cyclobenzaprine as needed at night.  Patient is to follow-up with primary care provider within 1 week as needed.  ED precautions given.  Note to Patient  The 21st Century Cures Act makes medical notes like these available to patients in the interest of transparency. However, be advised this is a medical document and is intended as [id number] communication; it is written in medical language and may appear blunt, direct, or contain abbreviations or verbiage that are unfamiliar. Medical documents are intended to carry relevant information, facts as evident, and the clinical opinion of the practitioner.     This report has been produced using speech recognition software, and may contain errors related to grammar, punctuation, spelling, words or phrases unrecognized or not translated appropriately to text; these errors may be referred to the dictating provider for further clarification and/or addendum as needed.                                     Medical Decision Making  59-year-old male with hypertension presents today with complaints of left-sided rib pain and left knee abrasion after he tripped over a curb and fell into the mulch on his left side.  Patient states he was having pain overnight while he was sleeping while he was  lying on his left side.  Patient denies any shortness of breath associated with the rib pain.  Patient states he is unsure if he is up-to-date on his tetanus vaccination.    Problems Addressed:  Abrasion: acute illness or injury  Fall, initial encounter: acute illness or injury  Rib pain: acute illness or injury  Sprain of costal cartilage, initial encounter: acute illness or injury    Amount and/or Complexity of Data Reviewed  Radiology: ordered. Decision-making details documented in ED Course.  ECG/medicine tests: ordered. Decision-making details documented in ED Course.    Risk  Prescription drug management.        Disposition and Plan     Clinical Impression:  1. Abrasion    2. Fall, initial encounter    3. Rib pain    4. Sprain of costal cartilage, initial encounter         Disposition:  Discharge  4/30/2024  1:05 pm    Follow-up:  Ceasar Borjas  [address]  [address] [address]  [phone]    In 1 week            Medications Prescribed:  Current Discharge Medication List        START taking these medications    Details   naproxen 500 MG Oral Tab Take 1 tablet (500 mg total) by mouth 2 (two) times daily as needed.  Qty: 20 tablet, Refills: 0      cyclobenzaprine 10 MG Oral Tab Take 1 tablet (10 mg total) by mouth 3 (three) times daily as needed for Muscle spasms.  Qty: 20 tablet, Refills: 0

## 2024-06-13 ENCOUNTER — APPOINTMENT (OUTPATIENT)
Dept: GENERAL RADIOLOGY | Facility: HOSPITAL | Age: 59
End: 2024-06-13
Attending: EMERGENCY MEDICINE

## 2024-06-13 ENCOUNTER — HOSPITAL ENCOUNTER (OUTPATIENT)
Facility: HOSPITAL | Age: 59
Setting detail: OBSERVATION
Discharge: HOME | End: 2024-06-15
Attending: EMERGENCY MEDICINE | Admitting: HOSPITALIST

## 2024-06-13 ENCOUNTER — HOSPITAL ENCOUNTER (OUTPATIENT)
Age: 59
Discharge: ANOTHER HOSPITAL | End: 2024-06-13
Attending: EMERGENCY MEDICINE
Payer: COMMERCIAL

## 2024-06-13 VITALS
OXYGEN SATURATION: 97 % | RESPIRATION RATE: 20 BRPM | WEIGHT: 270 LBS | SYSTOLIC BLOOD PRESSURE: 155 MMHG | DIASTOLIC BLOOD PRESSURE: 108 MMHG | HEART RATE: 148 BPM | BODY MASS INDEX: 38.65 KG/M2 | HEIGHT: 70 IN | TEMPERATURE: 98 F

## 2024-06-13 DIAGNOSIS — I48.91 ATRIAL FIBRILLATION WITH RVR (HCC): Primary | ICD-10-CM

## 2024-06-13 DIAGNOSIS — I48.91 ATRIAL FIBRILLATION WITH RAPID VENTRICULAR RESPONSE (HCC): Primary | ICD-10-CM

## 2024-06-13 DIAGNOSIS — R00.2 PALPITATIONS: ICD-10-CM

## 2024-06-13 LAB
ALBUMIN SERPL-MCNC: 3.7 G/DL (ref 3.4–5)
ALBUMIN/GLOB SERPL: 1 {RATIO} (ref 1–2)
ALP LIVER SERPL-CCNC: 89 U/L
ALT SERPL-CCNC: 26 U/L
ANION GAP SERPL CALC-SCNC: 8 MMOL/L (ref 0–18)
APTT PPP: 28.4 SECONDS (ref 23–36)
AST SERPL-CCNC: 23 U/L (ref 15–37)
BASOPHILS # BLD AUTO: 0.04 X10(3) UL (ref 0–0.2)
BASOPHILS NFR BLD AUTO: 0.6 %
BILIRUB SERPL-MCNC: 0.6 MG/DL (ref 0.1–2)
BUN BLD-MCNC: 7 MG/DL (ref 9–23)
CALCIUM BLD-MCNC: 8.8 MG/DL (ref 8.5–10.1)
CHLORIDE SERPL-SCNC: 109 MMOL/L (ref 98–112)
CO2 SERPL-SCNC: 22 MMOL/L (ref 21–32)
CREAT BLD-MCNC: 0.74 MG/DL
D DIMER PPP FEU-MCNC: <0.27 UG/ML FEU (ref ?–0.61)
EGFRCR SERPLBLD CKD-EPI 2021: 103 ML/MIN/1.73M2 (ref 60–?)
EOSINOPHIL # BLD AUTO: 0.16 X10(3) UL (ref 0–0.7)
EOSINOPHIL NFR BLD AUTO: 2.3 %
ERYTHROCYTE [DISTWIDTH] IN BLOOD BY AUTOMATED COUNT: 12.2 %
GLOBULIN PLAS-MCNC: 3.6 G/DL (ref 2.8–4.4)
GLUCOSE BLD-MCNC: 153 MG/DL (ref 70–99)
HCT VFR BLD AUTO: 49.6 %
HGB BLD-MCNC: 17.2 G/DL
IMM GRANULOCYTES # BLD AUTO: 0.01 X10(3) UL (ref 0–1)
IMM GRANULOCYTES NFR BLD: 0.1 %
INR BLD: 1.09 (ref 0.8–1.2)
LYMPHOCYTES # BLD AUTO: 1.13 X10(3) UL (ref 1–4)
LYMPHOCYTES NFR BLD AUTO: 16.1 %
MAGNESIUM SERPL-MCNC: 2.2 MG/DL (ref 1.6–2.6)
MCH RBC QN AUTO: 30 PG (ref 26–34)
MCHC RBC AUTO-ENTMCNC: 34.7 G/DL (ref 31–37)
MCV RBC AUTO: 86.6 FL
MONOCYTES # BLD AUTO: 0.65 X10(3) UL (ref 0.1–1)
MONOCYTES NFR BLD AUTO: 9.2 %
NEUTROPHILS # BLD AUTO: 5.04 X10 (3) UL (ref 1.5–7.7)
NEUTROPHILS # BLD AUTO: 5.04 X10(3) UL (ref 1.5–7.7)
NEUTROPHILS NFR BLD AUTO: 71.7 %
OSMOLALITY SERPL CALC.SUM OF ELEC: 289 MOSM/KG (ref 275–295)
PLATELET # BLD AUTO: 177 10(3)UL (ref 150–450)
POTASSIUM SERPL-SCNC: 4 MMOL/L (ref 3.5–5.1)
PROT SERPL-MCNC: 7.3 G/DL (ref 6.4–8.2)
PROTHROMBIN TIME: 14.1 SECONDS (ref 11.6–14.8)
RBC # BLD AUTO: 5.73 X10(6)UL
SODIUM SERPL-SCNC: 139 MMOL/L (ref 136–145)
T4 FREE SERPL-MCNC: 1 NG/DL (ref 0.8–1.7)
TROPONIN I SERPL HS-MCNC: 11 NG/L
TSI SER-ACNC: 1.44 MIU/ML (ref 0.36–3.74)
WBC # BLD AUTO: 7 X10(3) UL (ref 4–11)

## 2024-06-13 PROCEDURE — 93005 ELECTROCARDIOGRAM TRACING: CPT

## 2024-06-13 PROCEDURE — 71045 X-RAY EXAM CHEST 1 VIEW: CPT | Performed by: EMERGENCY MEDICINE

## 2024-06-13 PROCEDURE — 99222 1ST HOSP IP/OBS MODERATE 55: CPT | Performed by: INTERNAL MEDICINE

## 2024-06-13 PROCEDURE — 93010 ELECTROCARDIOGRAM REPORT: CPT

## 2024-06-13 PROCEDURE — 99214 OFFICE O/P EST MOD 30 MIN: CPT

## 2024-06-13 RX ORDER — OMEPRAZOLE 20 MG/1
20 CAPSULE, DELAYED RELEASE ORAL
COMMUNITY
Start: 2024-03-28 | End: 2024-07-03

## 2024-06-13 RX ORDER — HEPARIN SODIUM AND DEXTROSE 10000; 5 [USP'U]/100ML; G/100ML
1000 INJECTION INTRAVENOUS ONCE
Status: COMPLETED | OUTPATIENT
Start: 2024-06-13 | End: 2024-06-14

## 2024-06-13 RX ORDER — MELATONIN
3 NIGHTLY PRN
Status: DISCONTINUED | OUTPATIENT
Start: 2024-06-13 | End: 2024-06-15

## 2024-06-13 RX ORDER — ONDANSETRON 2 MG/ML
4 INJECTION INTRAMUSCULAR; INTRAVENOUS EVERY 6 HOURS PRN
Status: DISCONTINUED | OUTPATIENT
Start: 2024-06-13 | End: 2024-06-15

## 2024-06-13 RX ORDER — ROSUVASTATIN CALCIUM 5 MG/1
5 TABLET, COATED ORAL DAILY
COMMUNITY
Start: 2024-01-12

## 2024-06-13 RX ORDER — METOPROLOL SUCCINATE 50 MG/1
50 TABLET, EXTENDED RELEASE ORAL DAILY
COMMUNITY
Start: 2023-10-30

## 2024-06-13 RX ORDER — IBUPROFEN 600 MG/1
600 TABLET ORAL ONCE
Status: DISCONTINUED | OUTPATIENT
Start: 2024-06-13 | End: 2024-06-13

## 2024-06-13 RX ORDER — SODIUM CHLORIDE 9 MG/ML
INJECTION, SOLUTION INTRAVENOUS CONTINUOUS
Status: ACTIVE | OUTPATIENT
Start: 2024-06-13 | End: 2024-06-14

## 2024-06-13 RX ORDER — HEPARIN SODIUM AND DEXTROSE 10000; 5 [USP'U]/100ML; G/100ML
INJECTION INTRAVENOUS CONTINUOUS
Status: DISCONTINUED | OUTPATIENT
Start: 2024-06-14 | End: 2024-06-14

## 2024-06-13 RX ORDER — HEPARIN SODIUM 1000 [USP'U]/ML
5000 INJECTION, SOLUTION INTRAVENOUS; SUBCUTANEOUS ONCE
Status: COMPLETED | OUTPATIENT
Start: 2024-06-13 | End: 2024-06-13

## 2024-06-13 NOTE — ED PROVIDER NOTES
Patient Seen in: [facility] Emergency Department      History     Chief Complaint   Patient presents with    Arrythmia/Palpitations     Stated Complaint: Afib w/RVR    Subjective:   HPI    Patient is a 59-year-old male presents emergency room with a history of heart palpitations which have been ongoing since earlier today.  The patient states he works typically overnights and that he was known to have an irregular heartbeat when he woke up this morning at about noon time went to the immediate care was found to be in atrial fibrillation with rapid ventricular response and was sent to the ER for further evaluation.  The patient complains of some lightheadedness when he stands denies history of any chest pain.  The patient denies history of any known atrial fibrillation in the past although he has had palpitations previously.  The patient states he has a known blockage approximately 30% in one of the vessels around his heart.  The patient denies history of any blood clots in the past.  Patient denies history of any other somatic complaints or discomfort at this time.    Objective:   Past Medical History:    Cancer (HCC)    prostate    Essential hypertension    Hyperlipidemia    Visual impairment    glasses              Past Surgical History:   Procedure Laterality Date    Laparoscopy, surgical prostatectomy, retropubic radical, w/nerve sparing  04/01/2019                Social History     Socioeconomic History    Marital status:    Tobacco Use    Smoking status: Never     Passive exposure: Never    Smokeless tobacco: Never   Vaping Use    Vaping status: Never Used   Substance and Sexual Activity    Alcohol use: Not Currently     Alcohol/week: 2.0 standard drinks of alcohol     Types: 2 Glasses of wine per week    Drug use: Never              Review of Systems    Positive for stated complaint: Afib w/RVR  Other systems are as noted in HPI.  Constitutional and vital signs reviewed.      All other systems  reviewed and negative except as noted above.    Physical Exam     ED Triage Vitals [06/13/24 1752]   /74   Pulse 92   Resp 16   Temp 97.9 °F (36.6 °C)   Temp src Temporal   SpO2 97 %   O2 Device None (Room air)       Current Vitals:   Vital Signs  BP: 149/83  Pulse: 94  Resp: 23  Temp: 97.9 °F (36.6 °C)  Temp src: Temporal  MAP (mmHg): (!) 101    Oxygen Therapy  SpO2: 98 %  O2 Device: None (Room air)            Physical Exam  GENERAL: Well-developed, well-nourished male sitting up breathing easily in no apparent distress.  Patient is nontoxic in appearance.  HEENT: Head is normocephalic, atraumatic. Pupils are 4 mm equally round and reactive to light. Oropharynx is clear. Mucous membranes are moist.  NECK: No stridor.  LUNGS: Clear to auscultation bilaterally with no wheeze. There is good equal air entry bilaterally.  HEART: Irregularly irregular rhythm with a tachycardic rate. Normal S1, S2 no S3, or S4. No murmur.  ABDOMEN: There is no focal tenderness to palpation appreciated anywhere throughout the abdomen. There is no guarding, no rebound, no mass, and no organomegaly appreciated. There is normoactive bowel sounds.   EXTREMITIES: There is no cyanosis, clubbing, or edema appreciated. Pulses are 2+ and equal in all 4 extremities.  NEURO: Patient is awake, alert and oriented to time place and person. Motor strength is 5 over 5 in all 4 extremities. There are no gross motor or sensory deficits appreciated.  Patient answering all questions appropriately         ED Course     Labs Reviewed   COMP METABOLIC PANEL (14) - Abnormal; Notable for the following components:       Result Value    Glucose 153 (*)     BUN 7 (*)     All other components within normal limits   CBC W/ DIFFERENTIAL - Abnormal; Notable for the following components:    RBC 5.73 (*)     All other components within normal limits   TROPONIN I HIGH SENSITIVITY - Normal   PROTHROMBIN TIME (PT) - Normal   PTT, ACTIVATED - Normal   FREE T4 (FREE  THYROXINE) - Normal   ASSAY, THYROID STIM HORMONE - Normal   CBC WITH DIFFERENTIAL WITH PLATELET    Narrative:     The following orders were created for panel order CBC With Differential With Platelet.  Procedure                               Abnormality         Status                     ---------                               -----------         ------                     CBC W/ DIFFERENTIAL[[id number]]          Abnormal            Final result                 Please view results for these tests on the individual orders.   RAINBOW DRAW LAVENDER   RAINBOW DRAW LIGHT GREEN   RAINBOW DRAW BLUE     EKG    Rate, intervals and axes as noted on EKG Report.  Rate: 126  Rhythm: Atrial fibrillation  Reading: Atrial fibrillation with rapid ventricular response.         I personally reviewed the patient's chest x-ray images and my individual interpretation shows no evidence of any acute infiltrate appreciated.  I also reviewed the official radiology report which showed results as noted below.        XR CHEST AP PORTABLE  (CPT=71045)    Result Date: 6/13/2024  CONCLUSION: No acute cardiopulmonary abnormality.   LOCATION:  [facility]      Dictated by (CST): Satya Ordoñez MD on 6/13/2024 at 7:30 PM     Finalized by (CST): Satya Ordoñez MD on 6/13/2024 at 7:31 PM               MDM          18:40 patient's heart rate is improved at this time.  Patient with no other new complaints this time.  Will continue to observe at this time.  Admission disposition: 6/13/2024  8:14 PM       19:55 patient sitting back and breathing easily in no apparent distress this time.  Patient with no complaints of any pain at this time.  Patient's heart rate is improved at this time.  Will continue to observe at this time.    Patient an IV line established blood were drawn including a CBC, chemistries, BUN and creatinine, and blood sugar all of which are unremarkable.  Liver function tests and troponin are found to be negative.  Thyroid function tests are  found be negative.  Coags are unremarkable.  Patient found to be atrial fibrillation with ventricular rate in the 130s upon arrival to the ER.  The patient underwent x-ray of the chest with results as noted above.  Patient was given Cardizem as a bolus and drip here in the ER.  The patient will be admitted to the hospital for further workup and care at this time.  The patient's heart rate was improved after Cardizem was initiated here in the ER.  Patient's case discussed with Dr. Dickey who came and saw the patient emergency room as well as [facility] cardiology.  The patient will be started on heparin at this time.  Patient was admitted for further care at this time.                             Medical Decision Making      Disposition and Plan     Clinical Impression:  1. Atrial fibrillation with rapid ventricular response (HCC)    2. Palpitations         Disposition:  Admit  6/13/2024  8:14 pm    Follow-up:  No follow-up provider specified.        Medications Prescribed:  Current Discharge Medication List                            Hospital Problems       Present on Admission  Date Reviewed: 4/30/2024            ICD-10-CM Noted POA    * (Principal) Atrial fibrillation with rapid ventricular response (HCC) I48.91 6/13/2024 Unknown

## 2024-06-13 NOTE — ED QUICK NOTES
Patient states he's been lightheaded with heart palpitations since about noon today. Went to [place] urgent care, told he was in Afib. Sent to ER. Patient arrives Afib RVR. No history of Afib. Takes Metoprolol for hypertension.

## 2024-06-13 NOTE — ED INITIAL ASSESSMENT (HPI)
Patient to the ER c/o heart palpitations. Patient was at [facility] and had EKG that showed a fib with RVR. Lightheaded when he stands no dizziness. No chest pain

## 2024-06-13 NOTE — ED PROVIDER NOTES
Patient Seen in: [facility]      History     Chief Complaint   Patient presents with    Dizziness    Arrythmia/Palpitations     Stated Complaint: heart racing, dizzy, lightheaded    Subjective:   HPI    This is a pleasant 59-year-old male with a history of hypertension, history of hyperlipidemia, who presents to the immediate care for complaints of palpitations.  Patient states that since last night he noticed that it was heart was racing.  He denies having any chest pain or shortness of breath.  He had some slight dizziness.  He denies any acute complaints of any arm weakness numbness or tingling.  Denies any facial droop or slurred speech.  Patient has no prior history of SVT atrial fibrillation or atrial flutter.  He has no prior history of diagnosed sleep apnea.  No history of any hyperthyroidism.      Objective:   No pertinent past medical history.            No pertinent past surgical history.              No pertinent social history.            Review of Systems    Positive for stated complaint: heart racing, dizzy, lightheaded  Other systems are as noted in HPI.  Constitutional and vital signs reviewed.      All other systems reviewed and negative except as noted above.    Physical Exam     ED Triage Vitals [06/13/24 1716]   BP (!) 155/108   Pulse (!) 148   Resp 20   Temp 97.9 °F (36.6 °C)   Temp src Temporal   SpO2 97 %   O2 Device None (Room air)       Current Vitals:   Vital Signs  BP: (!) 155/108  Pulse: (!) 148  Resp: 20  Temp: 97.9 °F (36.6 °C)  Temp src: Temporal    Oxygen Therapy  SpO2: 97 %  O2 Device: None (Room air)            Physical Exam  General: Alert and oriented. No acute distress.  HEENT: Normocephalic. No evidence of trauma. Extraocular movements are intact.  Cardiovascular exam: Irregularly irregular  Lungs: Clear to auscultation bilaterally.  Abdomen: Soft, nondistended, nontender.  Extremities: No evidence of deformity. No clubbing or cyanosis.  Neuro: No focal  deficit is noted.       ED Course   Labs Reviewed - No data to display  EKG    Rate, intervals and axes as noted on EKG Report.  Rate: 134  Rhythm: Atrial Fibrillation  Reading: Atrial fibrillation with rapid ventricular response.  Ventricular rate 134.  Nonspecific ST changes.  No acute ST elevation or depression.  Rate, axis, intervals are noted.  Agree with computer interpretation           Discussed with the patient and wife at bedside that he has new onset A-fib with RVR.  Patient will require further workup in the emergency department and also rate controlled.  Patient was offered transport by ambulance but declines.  States that his wife will drive him by private car.    Patient currently pain-free.  He is otherwise hemodynamically stable.  Report was given to the charge nurse Braxton Farley.       MDM   Patient was screened and evaluated during this visit.   As a treating physician attending to the patient, I determined, within reasonable clinical confidence and prior to discharge, that an emergency medical condition was not or was no longer present.  There was no indication for further evaluation, treatment or admission on an emergency basis.  Comprehensive verbal and written discharge and follow-up instructions were provided to help prevent relapse or worsening.  Patient was instructed to follow-up with her primary care provider for further evaluation and treatment, but to return immediately to the ER for worsening, concerning, new, changing or persisting symptoms.  I discussed the case with the patient and they had no questions, complaints, or concerns.  Patient felt comfortable going home.    ^^Please note that this report has been produced using speech recognition software and may contain errors related to that system including, but not limited to, errors in grammar, punctuation, and spelling, as well as words and phrases that possibly may have been recognized inappropriately.  If there are any  questions or concerns, contact the dictating provider for clarification                                 MDM    Disposition and Plan     Clinical Impression:  1. Atrial fibrillation with RVR (HCC)         Disposition:  Ic to ed  6/13/2024  5:26 pm    Follow-up:  No follow-up provider specified.        Medications Prescribed:  Current Discharge Medication List

## 2024-06-14 ENCOUNTER — APPOINTMENT (OUTPATIENT)
Dept: CV DIAGNOSTICS | Facility: HOSPITAL | Age: 59
End: 2024-06-14
Attending: INTERNAL MEDICINE

## 2024-06-14 PROBLEM — E78.5 HYPERLIPIDEMIA: Status: ACTIVE | Noted: 2024-06-14

## 2024-06-14 PROBLEM — I10 PRIMARY HYPERTENSION: Status: ACTIVE | Noted: 2024-06-14

## 2024-06-14 PROBLEM — E66.01 MORBID OBESITY (HCC): Status: ACTIVE | Noted: 2024-06-14

## 2024-06-14 LAB
APTT PPP: 61.9 SECONDS (ref 23–36)
Q-T INTERVAL: 312 MS
Q-T INTERVAL: 312 MS
QRS DURATION: 100 MS
QRS DURATION: 92 MS
QTC CALCULATION (BEZET): 451 MS
QTC CALCULATION (BEZET): 465 MS
R AXIS: 0 DEGREES
R AXIS: 4 DEGREES
T AXIS: 103 DEGREES
T AXIS: 112 DEGREES
VENTRICULAR RATE: 126 BPM
VENTRICULAR RATE: 134 BPM

## 2024-06-14 PROCEDURE — 99232 SBSQ HOSP IP/OBS MODERATE 35: CPT | Performed by: HOSPITALIST

## 2024-06-14 PROCEDURE — 93306 TTE W/DOPPLER COMPLETE: CPT | Performed by: INTERNAL MEDICINE

## 2024-06-14 RX ORDER — MIRABEGRON 50 MG/1
50 TABLET, EXTENDED RELEASE ORAL DAILY
Status: DISCONTINUED | OUTPATIENT
Start: 2024-06-14 | End: 2024-06-15

## 2024-06-14 RX ORDER — DIPHENHYDRAMINE HCL 25 MG
50 CAPSULE ORAL NIGHTLY PRN
Status: DISCONTINUED | OUTPATIENT
Start: 2024-06-14 | End: 2024-06-15

## 2024-06-14 RX ORDER — ROSUVASTATIN CALCIUM 5 MG/1
5 TABLET, COATED ORAL DAILY
Status: DISCONTINUED | OUTPATIENT
Start: 2024-06-14 | End: 2024-06-15

## 2024-06-14 RX ORDER — PANTOPRAZOLE SODIUM 20 MG/1
20 TABLET, DELAYED RELEASE ORAL
Status: DISCONTINUED | OUTPATIENT
Start: 2024-06-14 | End: 2024-06-15

## 2024-06-14 RX ORDER — DILTIAZEM HYDROCHLORIDE 120 MG/1
120 CAPSULE, COATED, EXTENDED RELEASE ORAL DAILY
Qty: 30 CAPSULE | Refills: 3 | Status: SHIPPED | OUTPATIENT
Start: 2024-06-14 | End: 2024-06-15

## 2024-06-14 RX ORDER — DILTIAZEM HYDROCHLORIDE 120 MG/1
120 CAPSULE, EXTENDED RELEASE ORAL DAILY
Status: DISCONTINUED | OUTPATIENT
Start: 2024-06-14 | End: 2024-06-15

## 2024-06-14 RX ORDER — METOPROLOL SUCCINATE 50 MG/1
50 TABLET, EXTENDED RELEASE ORAL
Status: DISCONTINUED | OUTPATIENT
Start: 2024-06-14 | End: 2024-06-15

## 2024-06-14 NOTE — CM/SW NOTE
SW order acknowledged. Case discussed with RN, and tubed printed script to [facility].    [facility] pharmacy: Eliquis copay for pt is $35, he is eligible for free 30 day, or $10 copay card.    Discussed above with pt, agreeable to having [facility] pharmacy fill script. Reports he prefers free 30 day.    RN/pharmacy updated. RN to call [facility] when pt is medically cleared [id number].      SW/CM to remain available for dc planning, and/or additional need for support.    Brayan Simpson, ABBY  Discharge Planner  [id number]

## 2024-06-14 NOTE — PLAN OF CARE
Francisco Rahman Patient Status:  Observation    1/10/1965 MRN [id number]   Location [facility] 2NE-A Attending Nata Dickey MD   Hosp Day # 0 PCP JAIME CHAN       Cardiology Nocturnal APN Note    Page Received: Dr. Dimas, ED Physician    HPI:     Patient is a 59 year old male with PMH of  HTN, HLD, and prostate cancer who presented to the ED with palpitations. Pt reported waking up today feeling like his heart beat was irregular. Pt went to immediate care and was found to be in atrial fibrillation with rapid ventricular response. Cardizem gtt was started. He was subsequently sent to the ED for further evaluation. Pt denied chest pain or dyspnea. Further cardiac work up in ED was unremarkable. [facility] consulted for new onset afib with RVR. Coronary angiogram in  showed nonobstructive CAD and normal left sided filling pressures. HPI obtained from chart review and information provided by ED physician.         ED Clinical Course    EKG: Afib with RVR-rate in the 130    Labs: Unremarkable    Imaging: CXR unremarkable    Medications: IV Cardizem, IV heparin per afib protocol        Assessment/Plan:    - Titrate Cardizem for heart rate control as appropriate  - Continue IV heparin  - 2D echocardiogram pending  - Continue to monitor overnight on telemetry  - Formal cardiology consult to follow in AM.       SAMI Goel  [facility]  2024  2:45 AM

## 2024-06-14 NOTE — PLAN OF CARE
Received patient at 0730. Patient alert and oriented x4. Tele Rhythm A-Fib - HR 's at rest. With activity 100-130's.   O2 sats on RA.Lungs clear. Bed is locked and low position. Call light & personal belongings within reach. Denies chest pain at this time. Patient voiding WNL. Patient tolerating ambulation well independently.  Skin dry and intact. Reviewed plan of care with patient and verbalized understanding.     POC: Cards following:     Heparin drip for A-Fib - script for Elaquis and priced  Echo done EF 60-65%    1700: relayed to cards resting -120's, asymptomatic    Problem: CARDIOVASCULAR - ADULT  Goal: Maintains optimal cardiac output and hemodynamic stability  Description: INTERVENTIONS:  - Monitor vital signs, rhythm, and trends  - Monitor for bleeding, hypotension and signs of decreased cardiac output  - Evaluate effectiveness of vasoactive medications to optimize hemodynamic stability  - Monitor arterial and/or venous puncture sites for bleeding and/or hematoma  - Assess quality of pulses, skin color and temperature  - Assess for signs of decreased coronary artery perfusion - ex. Angina  - Evaluate fluid balance, assess for edema, trend weights  Outcome: Progressing  Goal: Absence of cardiac arrhythmias or at baseline  Description: INTERVENTIONS:  - Continuous cardiac monitoring, monitor vital signs, obtain 12 lead EKG if indicated  - Evaluate effectiveness of antiarrhythmic and heart rate control medications as ordered  - Initiate emergency measures for life threatening arrhythmias  - Monitor electrolytes and administer replacement therapy as ordered  Outcome: Progressing     Problem: Patient/Family Goals  Goal: Patient/Family Long Term Goal  Description: Patient's Long Term Goal: Go home    Interventions:  - routine lab draws  - medication compliance   - MD rounding  - See additional Care Plan goals for specific interventions  Outcome: Progressing  Goal: Patient/Family Short Term  Goal  Description: Patient's Short Term Goal: pain free    Interventions:   - ice pack  -pain medication  - See additional Care Plan goals for specific interventions  Outcome: Progressing

## 2024-06-14 NOTE — PLAN OF CARE
Assumed care of patient at 1930, alert and oriented x4,   Atrial fib on tele, diltiazem drip stopped at 0310 per given parameters.  Room air, lung sounds clear bilaterally, no cough noted. Continuous pulse oximetry maintained. Denies shortness of breath and dizziness.   No complaints of pain.   Continent of bowel and bladder with increased urinary frequency.   Glasses present at bedside.  Safety precautions maintained    Discussed plan of care with patient and family. All questions answered.    Problem: CARDIOVASCULAR - ADULT  Goal: Maintains optimal cardiac output and hemodynamic stability  Description: INTERVENTIONS:  - Monitor vital signs, rhythm, and trends  - Monitor for bleeding, hypotension and signs of decreased cardiac output  - Evaluate effectiveness of vasoactive medications to optimize hemodynamic stability  - Monitor arterial and/or venous puncture sites for bleeding and/or hematoma  - Assess quality of pulses, skin color and temperature  - Assess for signs of decreased coronary artery perfusion - ex. Angina  - Evaluate fluid balance, assess for edema, trend weights  Outcome: Progressing  Goal: Absence of cardiac arrhythmias or at baseline  Description: INTERVENTIONS:  - Continuous cardiac monitoring, monitor vital signs, obtain 12 lead EKG if indicated  - Evaluate effectiveness of antiarrhythmic and heart rate control medications as ordered  - Initiate emergency measures for life threatening arrhythmias  - Monitor electrolytes and administer replacement therapy as ordered  Outcome: Progressing     Problem: Patient/Family Goals  Goal: Patient/Family Long Term Goal  Description: Patient's Long Term Goal: Go home    Interventions:  - routine lab draws  - medication compliance   - MD rounding  - See additional Care Plan goals for specific interventions  Outcome: Progressing  Goal: Patient/Family Short Term Goal  Description: Patient's Short Term Goal: pain free    Interventions:   - ice pack  -pain  medication  - See additional Care Plan goals for specific interventions  Outcome: Progressing

## 2024-06-14 NOTE — ED QUICK NOTES
Orders for admission, patient is aware of plan and ready to go upstairs. Any questions, please call ED RN Libby at extension [phone].     Patient Covid vaccination status: Fully vaccinated     COVID Test Ordered in ED: None    COVID Suspicion at Admission: N/A    Running Infusions:    dilTIAZem 5 mg/hr (06/13/24 1824)    [START ON 6/14/2024] continuous dose heparin          Mental Status/LOC at time of transport: Alert, oriented X4. He's a nurse. Pleasant.  20g Right hand with Cardizem at 5mg/hr.  18g RAC with Heparin at 1000units/hr.   Rate controlled now in 80s-90s bpm.     Other pertinent information:   CIWA score: N/A   NIH score:  N/A

## 2024-06-14 NOTE — H&P
[facility]                                                               [facility]         Francisco Rahman Patient Status:  Emergency    1/10/1965 MRN [id number]   Location [facility] EMERGENCY DEPARTMENT Attending Andres Dimas MD   Hosp Day # 0 PCP JAIME CHAN     Chief Complaint: Palpitations    History of Present Illness:  Francisco Rahman is a 59 year old male admitted with palpitation.  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  Worked last night and was sleeping today.  Symptoms started around noontime today.  Complains of palpitations.  Associated lightheadedness.  Denies any chest pain.  Patient has history of CAD and hypertension hyperlipidemia.  No previous history of atrial fibrillation.  Denies any recent long trips.  Denies any calf tenderness.  Denies any heavy alcohol use.  Takes occasional caffeine pills, has not taken back for a long time according to patient.  On arrival to emergency room patient found to be in A-fib with RVR with a heart rate of 134/min, was started on Cardizem drip and heparin drip and admitted with cardiology on consult    History:  Past Medical History:    Cancer (HCC)    prostate    Essential hypertension    Hyperlipidemia    Visual impairment    glasses       Past Surgical History:   Procedure Laterality Date    Laparoscopy, surgical prostatectomy, retropubic radical, w/nerve sparing  2019       Family history:  Family History   Problem Relation Age of Onset    Cancer Father         lung    Heart Disorder Father         a fib    Heart Disorder Mother         afib    Cancer Maternal Grandfather         prostate    Heart Disorder Maternal Grandfather         cbag      Reviewed    Social history:   reports that he has never smoked. He has never been exposed to tobacco smoke. He has never used smokeless tobacco. He reports that he does not currently use alcohol after a past usage of about 2.0 standard drinks of alcohol per week. He reports that he does  not use drugs.    Allergies:  Allergies   Allergen Reactions    Chlorhexidine ANAPHYLAXIS       Home Medications:  (Not in a hospital admission)      Review of Systems:  A comprehensive 14 point review of systems was completed.  Pertinent positives and negatives noted in the the HPI.    Physical Exam:     Vital signs: Blood pressure 113/81, pulse 79, temperature 97.9 °F (36.6 °C), temperature source Temporal, resp. rate 24, weight 279 lb 1.6 oz (126.6 kg), SpO2 97%.  General: No acute distress.   HEENT: Moist mucous membranes.   Respiratory: Clear to auscultation bilaterally.  No wheezes. No rhonchi.  Cardiovascular: S1, S2.  Irregularly irregular  Abdomen: Soft, nontender, nondistended.  Positive bowel sounds.   Neurologic: No focal neurological deficits.  Musculoskeletal: Full range of motion of all extremities.  No pedal edema or calf tenderness  Psychiatric: Appropriate mood and affect.      Diagnostic Data:      Laboratory Data:   Lab Results   Component Value Date    WBC 7.0 06/13/2024    HGB 17.2 06/13/2024    HCT 49.6 06/13/2024    .0 06/13/2024    CREATSERUM 0.74 06/13/2024    BUN 7 06/13/2024     06/13/2024    K 4.0 06/13/2024     06/13/2024    CO2 22.0 06/13/2024     06/13/2024    CA 8.8 06/13/2024    ALB 3.7 06/13/2024    ALKPHO 89 06/13/2024    BILT 0.6 06/13/2024    TP 7.3 06/13/2024    AST 23 06/13/2024    ALT 26 06/13/2024    PTT 28.4 06/13/2024    INR 1.09 06/13/2024    PTP 14.1 06/13/2024    T4F 1.0 06/13/2024    TSH 1.440 06/13/2024       Recent Labs   Lab 06/13/24 1802   PTP 14.1   INR 1.09     Recent Labs   Lab 06/13/24  1802   TROPHS 11   EKG shows atrial fibrillation with rapid medical rate with a heart rate of 134/min.  Nonspecific ST-T wave changes    Imaging:  Imaging data reviewed in Epic.  Chest x-ray done on 6/13/2024 with no acute cardiopulmonary abnormality    ASSESSMENT / PLAN:     #Atrial fibrillation with rapid ventricular rate  #Palpitations and  lightheadedness due to above  Cardizem drip  Heparin drip  2D echo  Cardiology consult  #Hypertension  Continue home metoprolol  #Hyperlipidemia  Continue home statin  #GERD  Continue home PPI  #History of prostate cancer  #Morbid obesity with a BMI around 40    Quality:  DVT Prophylaxis: DVT Mechanical Prophylaxis:        DVT Pharmacologic Prophylaxis   Medication    heparin (Porcine) 1000 UNIT/ML injection - BOLUS IV 5,000 Units    heparin (Porcine) 03350 units/250mL infusion ED INITIAL DOSE    [START ON 6/14/2024] heparin (Porcine) 56530 units/250mL infusion ACS/AFIB CONTINUOUS              CODE status:   Code Status: Full Code  Ferguson: No urinary catheter in place      Plan of care discussed with patient, patient's wife at bedside      Discussed with ER Physician.  My colleague will follow from morning tomorrow          Nata Dickey MD  6/13/2024  8:26 PM

## 2024-06-14 NOTE — PROGRESS NOTES
[facility]   part of [facility]     Hospitalist Progress Note     Francisco Rahman Patient Status:  Observation    1/10/1965 MRN [id number]   Location [facility] 2NE-A Attending Juan Diego Lynn DO   Hosp Day # 0 PCP JAIME CHAN     Chief Complaint: Palpitations    Subjective:     Patient seen and examined. Feeling well. Denies CP/palpitations.     Objective:    Review of Systems:   A comprehensive review of systems was completed; pertinent positive and negatives stated in subjective.    Vital signs:  Temp:  [97.8 °F (36.6 °C)-98.2 °F (36.8 °C)] 98.2 °F (36.8 °C)  Pulse:  [] 96  Resp:  [14-26] 14  BP: (113-155)/() 122/92  SpO2:  [95 %-98 %] 97 %    Physical Exam:    General: No acute distress  Respiratory: No wheezes, no rhonchi  Cardiovascular: Irregularly irregular.   Abdomen: Soft, Non-tender, non-distended, positive bowel sounds  Neuro: No new focal deficits.   Extremities: No edema    Diagnostic Data:    Labs:  Recent Labs   Lab 24  1802   WBC 7.0   HGB 17.2   MCV 86.6   .0   INR 1.09       Recent Labs   Lab 24  1802   *   BUN 7*   CREATSERUM 0.74   CA 8.8   ALB 3.7      K 4.0      CO2 22.0   ALKPHO 89   AST 23   ALT 26   BILT 0.6   TP 7.3       Estimated Creatinine Clearance: 108.2 mL/min (based on SCr of 0.74 mg/dL).    Recent Labs   Lab 24  1802   TROPHS 11       Recent Labs   Lab 24  1802   PTP 14.1   INR 1.09       Lab Results   Component Value Date    TSH 1.440 2024    T4F 1.0 2024             Microbiology    No results found for this visit on 24.      Imaging: Reviewed in Epic.    Medications:    [Held by provider] Mirabegron ER  50 mg Oral Daily    metoprolol succinate ER  50 mg Oral Daily Beta Blocker    rosuvastatin  5 mg Oral Daily    pantoprazole  20 mg Oral QAM AC       Assessment & Plan:      #Afib with RVR  -BB  -Heparin gtt - transition to DOAC when ok with cardiology  -Tele  -Echo without acute  findings    #DL  -Statin    #GERD  -PPI    #Disposition  -Possible DC home later today if rate controlled and ok with cardiology       Jaun Diego Lynn DO    Supplementary Documentation:     Quality:  DVT Mechanical Prophylaxis:   SCDs,    DVT Pharmacologic Prophylaxis   Medication    heparin (Porcine) 67891 units/250mL infusion ACS/AFIB CONTINUOUS                Code Status: Full Code  Ferguson: No urinary catheter in place  Ferguson Duration (in days):   Central line:    DREA: 6/14/2024    Discharge is dependent on: clinical progress  At this point Mr. Rahman is expected to be discharge to: home    The 21st Century Cures Act makes medical notes like these available to patients in the interest of transparency. Please be advised this is a medical document. Medical documents are intended to carry relevant information, facts as evident, and the clinical opinion of the practitioner. The medical note is intended as peer to peer communication and may appear blunt or direct. It is written in medical language and may contain abbreviations or verbiage that are unfamiliar.

## 2024-06-14 NOTE — CONSULTS
[facility]  Cardiology Consultation    Francisco Rahman Patient Status:  Observation    1/10/1965 MRN [id number]   Location [facility] 2NE-A Attending Juan Diego Lynn,    Hosp Day # 0 PCP JAIME CHAN     Consults      Reason for Consultation     Atrial fibrillation        History of Present Illness     Francisco Rahman is a a(n) 59 year old male here with palpitations and noted to be in atrial fibrillation with RVR.  Patient has a history of nonobstructive coronary disease, preserved EF who reports having had an episode of SVT approximately 15 years ago.  He is a nurse in the pediatric ICU.  He reports they did not give him adenosine at that time.  The heart rate gradually came down and converted to sinus rhythm    This episode likely lasted longer than 24 hours.  He reports palpitations and increased heart rate.  He also has had chest pain in the past but has had stress test that did not show any ischemia    Patient has a [id number] score of 1 for hypertension.    Risk factors for A-fib include hypertension and obesity    Currently on IV heparin.  IV Cardizem was converted to oral Cardizem this morning    Echocardiogram performed echocardiogram shows an EF of 60 to 65%.  Left atrial size normal      History:     Past Medical History:    Cancer (HCC)    prostate    Essential hypertension    Hyperlipidemia    Hyperlipidemia    Primary hypertension    Visual impairment    glasses     Past Surgical History:   Procedure Laterality Date    Laparoscopy, surgical prostatectomy, retropubic radical, w/nerve sparing  2019     Family History   Problem Relation Age of Onset    Cancer Father         lung    Heart Disorder Father         a fib    Heart Disorder Mother         afib    Cancer Maternal Grandfather         prostate    Heart Disorder Maternal Grandfather         cbag      reports that he has never smoked. He has never been exposed to tobacco smoke. He has never used smokeless tobacco. He reports  current alcohol use of about 2.0 standard drinks of alcohol per week. He reports that he does not use drugs.      Allergies:     Allergies   Allergen Reactions    Chlorhexidine ANAPHYLAXIS         Medications       Current Facility-Administered Medications:     [Held by provider] Mirabegron ER (Myrbetriq) 50 MG tablet TB24 50 mg, 50 mg, Oral, Daily    diphenhydrAMINE (Benadryl) cap/tab 50 mg, 50 mg, Oral, Nightly PRN    metoprolol succinate ER (Toprol XL) 24 hr tab 50 mg, 50 mg, Oral, Daily Beta Blocker    rosuvastatin (Crestor) tab 5 mg, 5 mg, Oral, Daily    pantoprazole (Protonix) DR tab 20 mg, 20 mg, Oral, QAM AC    dilTIAZem (cardIZEM) 100 mg in sodium chloride 0.9% 100 mL IVPB-ADDV, 2.5-20 mg/hr, Intravenous, Continuous    heparin (Porcine) 79905 units/250mL infusion ACS/AFIB CONTINUOUS, 200-3,000 Units/hr, Intravenous, Continuous    melatonin tab 3 mg, 3 mg, Oral, Nightly PRN    ondansetron (Zofran) 4 MG/2ML injection 4 mg, 4 mg, Intravenous, Q6H PRN      Review of Systems     10 point ROS was negative except  Palpitations      Telemetry:         Telemetry: Atrial fibrillation heart rate       Physical Exam     Physical Exam   Blood pressure (!) 145/98, pulse 76, temperature 97.8 °F (36.6 °C), temperature source Oral, resp. rate 19, weight 278 lb 10.6 oz (126.4 kg), SpO2 96%.  Temp (24hrs), Av.9 °F (36.6 °C), Min:97.8 °F (36.6 °C), Max:97.9 °F (36.6 °C)    Wt Readings from Last 3 Encounters:   24 278 lb 10.6 oz (126.4 kg)   24 270 lb (122.5 kg)   24 260 lb (117.9 kg)     Well-appearing  NAD  PERRLA/EOMI  Neck veins not elevated  Carotids- no bruits  CTA bilaterally  Cardiac-irregularly irregular S1-S2  Abdomen- Soft,Nontender, normal BS  Extremities- pulses normal  Edema-no edema  Mood /Affect Congruent  Skin- no lesions            Lab/Radiology Results     Recent Labs   Lab 24  1802   *   BUN 7*   CREATSERUM 0.74   EGFRCR 103   CA 8.8      K 4.0      CO2  22.0     Recent Labs   Lab 06/13/24  1802   RBC 5.73*   HGB 17.2   HCT 49.6   MCV 86.6   MCH 30.0   MCHC 34.7   RDW 12.2   NEPRELIM 5.04   WBC 7.0   .0         [unfilled]  No results for input(s): \"BNP\" in the last 168 hours.  Lab Results   Component Value Date    INR 1.09 06/13/2024     No results found for: \"TROP\"      XR CHEST AP PORTABLE  (CPT=71045)    Result Date: 6/13/2024  CONCLUSION: No acute cardiopulmonary abnormality.   LOCATION:  [facility]      Dictated by (CST): Satya Ordoñez MD on 6/13/2024 at 7:30 PM     Finalized by (CST): Satya Ordoñez MD on 6/13/2024 at 7:31 PM       EKG    Result Date: 6/14/2024  Atrial fibrillation with rapid ventricular response Minimal voltage criteria for LVH, may be normal variant ( R in aVL ) Nonspecific ST and T wave abnormality Abnormal ECG When compared with ECG of 13-JUN-2024 17:24, No significant change was found    EKG    Result Date: 6/14/2024  Atrial fibrillation with rapid ventricular response Minimal voltage criteria for LVH, may be normal variant ( R in aVL ) Nonspecific ST and T wave abnormality Abnormal ECG When compared with ECG of 27-JAN-2024 17:00, Atrial fibrillation has replaced Sinus rhythm       Problem List     Patient Active Problem List   Diagnosis    Hyperglycemia    Anaphylactic syndrome    Anaphylaxis, initial encounter    Atrial fibrillation with rapid ventricular response (HCC)    Palpitations    Primary hypertension    Hyperlipidemia    Morbid obesity (HCC)       Diagnostic Testing     ECG   TTE EF 60 to 65%, normal left atrial size  Stress Testing 2022-IMPRESSION: Normal stress echocardiographic images make significant underlying coronary disease unlikely. This study is suggestive of a \"false positive\" exercise echocardiogram. Clinical correlation is recommended.   EP Procedures none    Assessment     Atrial Fibrillation  NEO  HTN  GERD  HL  Nonobstructive CAD      Plan     Agree with converting IV Cardizem to p.o. Cardizem  Would price out  DOAC's and start that now.  Eliquis or Xarelto are both acceptable  Discussed if he did not convert and he was rate controlled he could be discharged home with outpatient cardioversion.  If he is not rate controlled or feels uncomfortable with his rates he should stay over the weekend to have a ANY cardioversion on Monday        C5      Danielle Santiago MD    Cardiac Electrophysiology  [facility]  6/14/2024  7:47 AM

## 2024-06-15 VITALS
HEART RATE: 97 BPM | DIASTOLIC BLOOD PRESSURE: 88 MMHG | OXYGEN SATURATION: 98 % | BODY MASS INDEX: 40 KG/M2 | SYSTOLIC BLOOD PRESSURE: 144 MMHG | TEMPERATURE: 98 F | RESPIRATION RATE: 22 BRPM | WEIGHT: 278.69 LBS

## 2024-06-15 LAB
CHOLEST SERPL-MCNC: 84 MG/DL (ref ?–200)
COMPLEXED PSA SERPL-MCNC: 0.01 NG/ML (ref ?–4)
HDLC SERPL-MCNC: 42 MG/DL (ref 40–59)
LDLC SERPL CALC-MCNC: 15 MG/DL (ref ?–100)
NONHDLC SERPL-MCNC: 42 MG/DL (ref ?–130)
TRIGL SERPL-MCNC: 165 MG/DL (ref 30–149)
VLDLC SERPL CALC-MCNC: 21 MG/DL (ref 0–30)

## 2024-06-15 PROCEDURE — 99239 HOSP IP/OBS DSCHRG MGMT >30: CPT | Performed by: HOSPITALIST

## 2024-06-15 RX ORDER — DILTIAZEM HYDROCHLORIDE 180 MG/1
180 CAPSULE, COATED, EXTENDED RELEASE ORAL DAILY
Qty: 30 CAPSULE | Refills: 3 | Status: SHIPPED | OUTPATIENT
Start: 2024-06-16

## 2024-06-15 RX ORDER — DILTIAZEM HYDROCHLORIDE 180 MG/1
180 CAPSULE, EXTENDED RELEASE ORAL DAILY
Status: DISCONTINUED | OUTPATIENT
Start: 2024-06-15 | End: 2024-06-15

## 2024-06-15 RX ORDER — DILTIAZEM HYDROCHLORIDE 240 MG/1
240 CAPSULE, COATED, EXTENDED RELEASE ORAL DAILY
Status: DISCONTINUED | OUTPATIENT
Start: 2024-06-15 | End: 2024-06-15

## 2024-06-15 NOTE — PLAN OF CARE
Assumed care of patient at 1930, alert and oriented x4,   Atrial fib on tele, HR 90-110s   Room air, lung sounds clear bilaterally, no cough noted. Continuous pulse oximetry maintained. Denies shortness of breath and dizziness.   No complaints of pain.   Continent of bowel and bladder.   Glasses present at bedside.  Safety precautions maintained.    Discussed plan of care with patient and family. All questions answered.    03:15- brief episode of SVT upon ambulation, [facility] APRN aware    Problem: CARDIOVASCULAR - ADULT  Goal: Maintains optimal cardiac output and hemodynamic stability  Description: INTERVENTIONS:  - Monitor vital signs, rhythm, and trends  - Monitor for bleeding, hypotension and signs of decreased cardiac output  - Evaluate effectiveness of vasoactive medications to optimize hemodynamic stability  - Monitor arterial and/or venous puncture sites for bleeding and/or hematoma  - Assess quality of pulses, skin color and temperature  - Assess for signs of decreased coronary artery perfusion - ex. Angina  - Evaluate fluid balance, assess for edema, trend weights  6/15/2024 0058 by Jatinder Kenny RN  Outcome: Progressing  6/15/2024 0057 by Jatinder Kenny RN  Outcome: Progressing  Goal: Absence of cardiac arrhythmias or at baseline  Description: INTERVENTIONS:  - Continuous cardiac monitoring, monitor vital signs, obtain 12 lead EKG if indicated  - Evaluate effectiveness of antiarrhythmic and heart rate control medications as ordered  - Initiate emergency measures for life threatening arrhythmias  - Monitor electrolytes and administer replacement therapy as ordered  6/15/2024 0058 by Jatinder Kenny RN  Outcome: Progressing  6/15/2024 0057 by Jatinder Kenny RN  Outcome: Progressing     Problem: Patient/Family Goals  Goal: Patient/Family Long Term Goal  Description: Patient's Long Term Goal: Go home    Interventions:  - routine lab draws  - medication compliance   - MD rounding  - See additional Care Plan  goals for specific interventions  6/15/2024 0058 by Jatinder Kenny, RN  Outcome: Progressing  6/15/2024 0057 by Jatinder Kenny RN  Outcome: Progressing  Goal: Patient/Family Short Term Goal  Description: Patient's Short Term Goal: pain free    Interventions:   - ice pack  -pain medication  - See additional Care Plan goals for specific interventions  6/15/2024 0058 by Jatinder Kenny, RN  Outcome: Progressing  6/15/2024 0057 by Jatinder Kenny RN  Outcome: Progressing

## 2024-06-15 NOTE — PLAN OF CARE
Received pt at shift change. AxOx4. Room air. Denies pain/SOB. Vitals stable. Afib w rates in 90s on tele.   See flowsheets for additional assessments.   Pt updated on POC. Call light within reach. All needs met at this time.     Plan:  -Switch to cardizem 180mg ER   -Monitor HR    Problem: CARDIOVASCULAR - ADULT  Goal: Maintains optimal cardiac output and hemodynamic stability  Description: INTERVENTIONS:  - Monitor vital signs, rhythm, and trends  - Monitor for bleeding, hypotension and signs of decreased cardiac output  - Evaluate effectiveness of vasoactive medications to optimize hemodynamic stability  - Monitor arterial and/or venous puncture sites for bleeding and/or hematoma  - Assess quality of pulses, skin color and temperature  - Assess for signs of decreased coronary artery perfusion - ex. Angina  - Evaluate fluid balance, assess for edema, trend weights  Outcome: Progressing  Goal: Absence of cardiac arrhythmias or at baseline  Description: INTERVENTIONS:  - Continuous cardiac monitoring, monitor vital signs, obtain 12 lead EKG if indicated  - Evaluate effectiveness of antiarrhythmic and heart rate control medications as ordered  - Initiate emergency measures for life threatening arrhythmias  - Monitor electrolytes and administer replacement therapy as ordered  Outcome: Progressing     Problem: Patient/Family Goals  Goal: Patient/Family Long Term Goal  Description: Patient's Long Term Goal: Go home    Interventions:  - routine lab draws  - medication compliance   - MD rounding  - See additional Care Plan goals for specific interventions  Outcome: Progressing  Goal: Patient/Family Short Term Goal  Description: Patient's Short Term Goal: pain free    Interventions:   - ice pack  -pain medication  - See additional Care Plan goals for specific interventions  Outcome: Progressing

## 2024-06-15 NOTE — DISCHARGE SUMMARY
[facility]     Francisco Rahman Patient Status:  Observation    1/10/1965 MRN [id number]   Location [facility] 2NE-A Attending Juan Diego Lynn DO   Hosp Day # 0 PCP JAIME CHAN     Date of Admission: 2024  Date of Discharge:   6/15/2024    Discharge Disposition: Home    Discharge Diagnosis:  Afib with RVR  Dyslipidemia  GERD  Obesity     History of Present Illness: Francisco Rahman is a 59 year old male admitted with palpitation.  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  Worked last night and was sleeping today.  Symptoms started around noontime today.  Complains of palpitations.  Associated lightheadedness.  Denies any chest pain.  Patient has history of CAD and hypertension hyperlipidemia.  No previous history of atrial fibrillation.  Denies any recent long trips.  Denies any calf tenderness.  Denies any heavy alcohol use.  Takes occasional caffeine pills, has not taken back for a long time according to patient.  On arrival to emergency room patient found to be in A-fib with RVR with a heart rate of 134/min, was started on Cardizem drip and heparin drip and admitted with cardiology on consult    Brief Synopsis:     #Afib with RVR  -Rates controlled on BB/cardizem   -DOAC  -Tele  -Echo without acute findings  -Cardiology following     #DL  -Statin     #GERD  -PPI    #Obesity  -Body mass index is 39.98 kg/m².    #Disposition  -Stable for DC home    Lace+ Score: 40  59-90 High Risk  29-58 Medium Risk  0-28   Low Risk       TCM Follow-Up Recommendation:  LACE 29-58: Moderate Risk of readmission after discharge from the hospital.      Procedures during hospitalization:   None    Incidental or significant findings and recommendations (brief descriptions):  None    Lab/Test results pending at Discharge:   None    Consultants:  Cardiology    Discharge Medication List:     Medication List        START taking these medications      apixaban 5 MG Tabs  Commonly known as: Eliquis  Take 1 tablet  (5 mg total) by mouth 2 (two) times daily.     dilTIAZem HCl ER Coated Beads 180 MG Cp24  Commonly known as: CARDIZEM CD  Take 1 capsule (180 mg total) by mouth daily.            CONTINUE taking these medications      B COMPLEX OR     B-12 OR     Clindamycin Phosphate 1 % Gel     diphenhydrAMINE HCl 25 MG Tabs  Commonly known as: DIPHENHIST     EPINEPHrine 0.3 MG/0.3ML Soaj  Commonly known as: EpiPen     Evolocumab 140 MG/ML Soaj     K2 PLUS D3 OR     metoprolol succinate ER 50 MG Tb24  Commonly known as: Toprol XL     Mirabegron ER 50 MG Tb24  Commonly known as: Myrbetriq     omeprazole 20 MG Cpdr  Commonly known as: PriLOSEC     pimecrolimus 1 % Crea  Commonly known as: Elidel     rosuvastatin 5 MG Tabs  Commonly known as: Crestor     SILDENAFIL CITRATE OR     Tretinoin 0.01 % Gel               Where to Get Your Medications        These medications were sent to [id number] - [place], [place] - [address] [place] [address] [phone], [phone]  [address] [place] [id number]      Hours: 24-hours Phone: [phone]   dilTIAZem HCl ER Coated Beads 180 MG Cp24       You can get these medications from any pharmacy    Bring a paper prescription for each of these medications  apixaban 5 MG Tabs        [place] reviewed: N/A    Follow-up appointment:   Ceasar Borjas  [address] [facility]  M/C [address] [place] [address] [phone]    Schedule an appointment as soon as possible for a visit in 1 week(s)      Danielle Santiago MD  [facility] [address] [address] [phone]    Follow up in 1 week(s)  Office will call you for follow up appt.    Appointments for Next 30 Days 6/15/2024 - 7/15/2024      None        -----------------------------------------------------------------------------------------------  PATIENT DISCHARGE INSTRUCTIONS: See electronic chart    Juan Diego Lynn DO    Total time spent on discharge plannin minutes     The  Cures Act makes medical notes like these  available to patients in the interest of transparency. Please be advised this is a medical document. Medical documents are intended to carry relevant information, facts as evident, and the clinical opinion of the practitioner. The medical note is intended as peer to peer communication and may appear blunt or direct. It is written in medical language and may contain abbreviations or verbiage that are unfamiliar.

## 2024-06-15 NOTE — PROGRESS NOTES
Progress Note  Francisco Rahman Patient Status:  Observation    1/10/1965 MRN [id number]   Location [facility] 2NE-A Attending Juan Diego Lynn,    Hosp Day # 0 PCP JAIME CHAN     Subjective:  In chair, no acute distress. Denies cp, sob. Does have palpitations.     Objective:  BP (!) 153/96 (BP Location: Right arm)   Pulse (!) 132   Temp 97.8 °F (36.6 °C) (Oral)   Resp 24   Wt 278 lb 10.6 oz (126.4 kg)   SpO2 97%   BMI 39.98 kg/m²     Telemetry: afib      Intake/Output:    Intake/Output Summary (Last 24 hours) at 6/15/2024 0656  Last data filed at 2024 1700  Gross per 24 hour   Intake 720 ml   Output 550 ml   Net 170 ml       Last 3 Weights   24 2317 278 lb 10.6 oz (126.4 kg)   24 1752 279 lb 1.6 oz (126.6 kg)   24 1716 270 lb (122.5 kg)   24 1218 260 lb (117.9 kg)       Labs:  Recent Labs   Lab 24  1802   *   BUN 7*   CREATSERUM 0.74   EGFRCR 103   CA 8.8      K 4.0      CO2 22.0     Recent Labs   Lab 24  1802   RBC 5.73*   HGB 17.2   HCT 49.6   MCV 86.6   MCH 30.0   MCHC 34.7   RDW 12.2   NEPRELIM 5.04   WBC 7.0   .0         Recent Labs   Lab 24  1802   TROPHS 11       Review of Systems   Cardiovascular:  Positive for irregular heartbeat and palpitations.   Respiratory: Negative.         Physical Exam:    Gen: alert, oriented x 3, NAD  Heent: pupils equal, reactive. Mucous membranes moist.   Neck: no jvd  Cardiac: irregularly irregular, normal S1,S2  Lungs: CTA  Abd: soft, NT/ND +bs  Ext: no edema  Skin: Warm, dry  Neuro: No focal deficits        Medications:     [Held by provider] Mirabegron ER  50 mg Oral Daily    metoprolol succinate ER  50 mg Oral Daily Beta Blocker    rosuvastatin  5 mg Oral Daily    pantoprazole  20 mg Oral QAM AC    dilTIAZem ER  120 mg Oral Daily    apixaban  5 mg Oral BID             Assessment:  New onset Afib with RVR  Preserved LVEF 60-65%, no rwmas.   Kvept1dojb 1  SVT ~15 years  ago  Nonobstructive CAD  HTN  NEO-in past  States most recent study this has resolved with weight loss.   Esophageal stricture with dilatation 12/2023  Also difficult airway    Plan:  Transitioned to eliquis. Appreciate  assistance with pricing.   Rates remain elevated. Increase ccb.   Cont bb. Can increase dosing if needed.  If unable to obtain rate control-could attempt ANY/DCCV Monday--of note patient has hx esophageal stricture, webbing with dilatation 12/2023. If we can obtain rate control, can plan for OP DCCV after 4 weeks oral AC.     Plan of care discussed with patient, family at bedside, RN.    Maricruz Huff, SAMI  6/15/2024  6:56 AM  [phone]  [phone]        Patient seen and examined independently.  Note reviewed and labs reviewed. Agree with above assessment and plan.  59-year-old male who presented with A-fib with rapid ventricular response.  Evaluated by electrophysiology service yesterday.  Goal will be to obtain rate control if possible.  We have uptitrated beta-blocker and calcium channel blocker therapy.  If unable to control heart rate adequately, would need ANY/DCCV Monday.  May need GI evaluation given prior history of esophageal stricture s/p dilation 12/2023.  Otherwise, if heart rates are well-controlled, patient can discharge home on 4 weeks of oral anticoagulation and then be considered for DCCV.  Will continue to follow.        Ed Hernadez DO  Cardiologist  [facility]  6/15/2024 10:45 AM      Note to the patient: The 21st Century Cures Act makes medical notes like these available to patients in the interest of transparency. However, be advised that this is a medical document. It is intended as peer to peer communication. It is written in medical language and may contain abbreviations or verbiage that are unfamiliar. It may appear blunt or direct. Medical documents are intended to carry relevant information, facts as evident, and clinical  opinion of the practitioner.     Disclaimer: Components of this note were documented using voice recognition system and are subject to errors not corrected at proofreading. Contact the author of this note for any clarifications.

## 2024-06-15 NOTE — PROGRESS NOTES
[facility]   part of [facility]     Hospitalist Progress Note     Franciscodeborah Rahman Patient Status:  Observation    1/10/1965 MRN [id number]   Location [facility] 2NE-A Attending Juan Diego Lynn DO   Hosp Day # 0 PCP JAIME CHAN     Chief Complaint: Palpitations    Subjective:     Patient seen and examined. Denies CP/palpitations/SOB.     Objective:    Review of Systems:   A comprehensive review of systems was completed; pertinent positive and negatives stated in subjective.    Vital signs:  Temp:  [97.8 °F (36.6 °C)-98.5 °F (36.9 °C)] 98 °F (36.7 °C)  Pulse:  [] 113  Resp:  [14-26] 26  BP: (122-153)/(81-98) 153/94  SpO2:  [96 %-98 %] 98 %    Physical Exam:    General: No acute distress  Respiratory: No wheezes, no rhonchi  Cardiovascular: Irregularly irregular.   Abdomen: Soft, Non-tender, non-distended, positive bowel sounds  Neuro: No new focal deficits.   Extremities: No edema    Diagnostic Data:    Labs:  Recent Labs   Lab 24  1802   WBC 7.0   HGB 17.2   MCV 86.6   .0   INR 1.09       Recent Labs   Lab 24  1802   *   BUN 7*   CREATSERUM 0.74   CA 8.8   ALB 3.7      K 4.0      CO2 22.0   ALKPHO 89   AST 23   ALT 26   BILT 0.6   TP 7.3       Estimated Creatinine Clearance: 108.2 mL/min (based on SCr of 0.74 mg/dL).    Recent Labs   Lab 24  1802   TROPHS 11       Recent Labs   Lab 24  1802   PTP 14.1   INR 1.09                    Microbiology    No results found for this visit on 24.      Imaging: Reviewed in Epic.    Medications:    dilTIAZem ER  180 mg Oral Daily    [Held by provider] Mirabegron ER  50 mg Oral Daily    metoprolol succinate ER  50 mg Oral Daily Beta Blocker    rosuvastatin  5 mg Oral Daily    pantoprazole  20 mg Oral QAM AC    apixaban  5 mg Oral BID       Assessment & Plan:      #Afib with RVR  -BB/cardizem   -DOAC  -Tele  -Echo without acute findings    #DL  -Statin    #GERD  -PPI    #Disposition  -DC planning if rate  adequately controlled       Juan Diego Lynn DO    Supplementary Documentation:     Quality:  DVT Mechanical Prophylaxis:   SCDs,    DVT Pharmacologic Prophylaxis   Medication    apixaban (Eliquis) tab 5 mg                Code Status: Full Code  Ferguson: No urinary catheter in place  Ferguson Duration (in days):   Central line:    DREA: 6/16/2024    Discharge is dependent on: clinical progress  At this point Mr. Rahman is expected to be discharge to: home    The 21st Century Cures Act makes medical notes like these available to patients in the interest of transparency. Please be advised this is a medical document. Medical documents are intended to carry relevant information, facts as evident, and the clinical opinion of the practitioner. The medical note is intended as peer to peer communication and may appear blunt or direct. It is written in medical language and may contain abbreviations or verbiage that are unfamiliar.

## 2024-10-03 ENCOUNTER — HOSPITAL ENCOUNTER (OUTPATIENT)
Age: 59
Discharge: ANOTHER HOSPITAL | End: 2024-10-03
Attending: EMERGENCY MEDICINE
Payer: COMMERCIAL

## 2024-10-03 ENCOUNTER — HOSPITAL ENCOUNTER (EMERGENCY)
Facility: HOSPITAL | Age: 59
Discharge: HOME | End: 2024-10-03
Attending: EMERGENCY MEDICINE
Payer: COMMERCIAL

## 2024-10-03 VITALS
HEIGHT: 70 IN | HEART RATE: 133 BPM | TEMPERATURE: 98 F | RESPIRATION RATE: 20 BRPM | DIASTOLIC BLOOD PRESSURE: 71 MMHG | WEIGHT: 275 LBS | SYSTOLIC BLOOD PRESSURE: 136 MMHG | OXYGEN SATURATION: 97 % | BODY MASS INDEX: 39.37 KG/M2

## 2024-10-03 VITALS
WEIGHT: 275 LBS | TEMPERATURE: 98 F | HEART RATE: 71 BPM | RESPIRATION RATE: 26 BRPM | OXYGEN SATURATION: 99 % | HEIGHT: 70 IN | SYSTOLIC BLOOD PRESSURE: 138 MMHG | DIASTOLIC BLOOD PRESSURE: 88 MMHG | BODY MASS INDEX: 39.37 KG/M2

## 2024-10-03 DIAGNOSIS — I48.91 ATRIAL FIBRILLATION WITH RVR (HCC): Primary | ICD-10-CM

## 2024-10-03 DIAGNOSIS — I48.20 ATRIAL FIBRILLATION, CHRONIC (HCC): Primary | ICD-10-CM

## 2024-10-03 LAB
ALBUMIN SERPL-MCNC: 4.5 G/DL (ref 3.2–4.8)
ALBUMIN/GLOB SERPL: 2 {RATIO} (ref 1–2)
ALP LIVER SERPL-CCNC: 82 U/L
ALT SERPL-CCNC: 15 U/L
ANION GAP SERPL CALC-SCNC: 7 MMOL/L (ref 0–18)
AST SERPL-CCNC: 25 U/L (ref ?–34)
BASOPHILS # BLD AUTO: 0.03 X10(3) UL (ref 0–0.2)
BASOPHILS NFR BLD AUTO: 0.5 %
BILIRUB SERPL-MCNC: 0.7 MG/DL (ref 0.2–1.1)
BUN BLD-MCNC: 7 MG/DL (ref 9–23)
CALCIUM BLD-MCNC: 9.2 MG/DL (ref 8.7–10.4)
CHLORIDE SERPL-SCNC: 109 MMOL/L (ref 98–112)
CO2 SERPL-SCNC: 26 MMOL/L (ref 21–32)
CREAT BLD-MCNC: 0.79 MG/DL
EGFRCR SERPLBLD CKD-EPI 2021: 101 ML/MIN/1.73M2 (ref 60–?)
EOSINOPHIL # BLD AUTO: 0.13 X10(3) UL (ref 0–0.7)
EOSINOPHIL NFR BLD AUTO: 2.3 %
ERYTHROCYTE [DISTWIDTH] IN BLOOD BY AUTOMATED COUNT: 12.2 %
GLOBULIN PLAS-MCNC: 2.3 G/DL (ref 2–3.5)
GLUCOSE BLD-MCNC: 140 MG/DL (ref 70–99)
HCT VFR BLD AUTO: 47.5 %
HGB BLD-MCNC: 16.6 G/DL
IMM GRANULOCYTES # BLD AUTO: 0.02 X10(3) UL (ref 0–1)
IMM GRANULOCYTES NFR BLD: 0.3 %
LYMPHOCYTES # BLD AUTO: 1.19 X10(3) UL (ref 1–4)
LYMPHOCYTES NFR BLD AUTO: 20.7 %
MCH RBC QN AUTO: 30.3 PG (ref 26–34)
MCHC RBC AUTO-ENTMCNC: 34.9 G/DL (ref 31–37)
MCV RBC AUTO: 86.8 FL
MONOCYTES # BLD AUTO: 0.59 X10(3) UL (ref 0.1–1)
MONOCYTES NFR BLD AUTO: 10.2 %
NEUTROPHILS # BLD AUTO: 3.8 X10 (3) UL (ref 1.5–7.7)
NEUTROPHILS # BLD AUTO: 3.8 X10(3) UL (ref 1.5–7.7)
NEUTROPHILS NFR BLD AUTO: 66 %
OSMOLALITY SERPL CALC.SUM OF ELEC: 294 MOSM/KG (ref 275–295)
PLATELET # BLD AUTO: 171 10(3)UL (ref 150–450)
POTASSIUM SERPL-SCNC: 4.5 MMOL/L (ref 3.5–5.1)
PROT SERPL-MCNC: 6.8 G/DL (ref 5.7–8.2)
RBC # BLD AUTO: 5.47 X10(6)UL
SODIUM SERPL-SCNC: 142 MMOL/L (ref 136–145)
TROPONIN I SERPL HS-MCNC: 6 NG/L
WBC # BLD AUTO: 5.8 X10(3) UL (ref 4–11)

## 2024-10-03 PROCEDURE — 99214 OFFICE O/P EST MOD 30 MIN: CPT

## 2024-10-03 PROCEDURE — 93005 ELECTROCARDIOGRAM TRACING: CPT

## 2024-10-03 PROCEDURE — 99284 EMERGENCY DEPT VISIT MOD MDM: CPT

## 2024-10-03 PROCEDURE — 93010 ELECTROCARDIOGRAM REPORT: CPT

## 2024-10-03 PROCEDURE — 84484 ASSAY OF TROPONIN QUANT: CPT | Performed by: EMERGENCY MEDICINE

## 2024-10-03 PROCEDURE — 80053 COMPREHEN METABOLIC PANEL: CPT | Performed by: EMERGENCY MEDICINE

## 2024-10-03 PROCEDURE — 36415 COLL VENOUS BLD VENIPUNCTURE: CPT

## 2024-10-03 PROCEDURE — 85025 COMPLETE CBC W/AUTO DIFF WBC: CPT | Performed by: EMERGENCY MEDICINE

## 2024-10-03 RX ORDER — METOPROLOL SUCCINATE 25 MG/1
25 TABLET, EXTENDED RELEASE ORAL ONCE
Status: COMPLETED | OUTPATIENT
Start: 2024-10-03 | End: 2024-10-03

## 2024-10-03 NOTE — DISCHARGE INSTRUCTIONS
Increase your metoprolol from 50 to 100 mg daily, continue Cardizem  Return if worse  Call your cardiologist today or tomorrow for further management of your medications

## 2024-10-03 NOTE — ED PROVIDER NOTES
Patient Seen in: [facility]      History     Chief Complaint   Patient presents with    Arrythmia/Palpitations     Stated Complaint: HEART RACING, HX OF AFIB    Subjective:   HPI    Patient is a 59-year-old male known history of atrial fibrillation on atenolol, Cardizem, Eliquis at this time he is scheduled to have an ablation done later this month with his cardiologist or [facility] as per patient.  Patient presents to the immediate care today with a history of his heart racing at this time.  The patient denies chest pain or shortness of breath.  The patient states he has been compliant with all of his medications at home.  The patient denies history of any vomiting or diarrhea.  The patient denies history of any other somatic complaints or discomfort at this time.    Objective:     Past Medical History:    Cancer (HCC)    prostate    Essential hypertension    Hyperlipidemia    Hyperlipidemia    Primary hypertension    Visual impairment    glasses              Past Surgical History:   Procedure Laterality Date    Laparoscopy, surgical prostatectomy, retropubic radical, w/nerve sparing  04/01/2019                No pertinent social history.            Review of Systems    Positive for stated complaint: HEART RACING, HX OF AFIB  Other systems are as noted in HPI.  Constitutional and vital signs reviewed.      All other systems reviewed and negative except as noted above.    Physical Exam     ED Triage Vitals [10/03/24 1614]   /71   Pulse (!) 133   Resp 20   Temp 97.7 °F (36.5 °C)   Temp src Temporal   SpO2 97 %   O2 Device None (Room air)       Current Vitals:   Vital Signs  BP: 136/71  Pulse: (!) 133  Resp: 20  Temp: 97.7 °F (36.5 °C)  Temp src: Temporal    Oxygen Therapy  SpO2: 97 %  O2 Device: None (Room air)        Physical Exam  GENERAL: Well-developed, well-nourished female sitting up breathing easily in no apparent distress.  Patient is nontoxic in appearance.  HEENT: Head is  normocephalic, atraumatic. Pupils are 4 mm equally round and reactive to light. Oropharynx is clear. Mucous membranes are moist.  LUNGS: Clear to auscultation bilaterally with no wheeze. There is good equal air entry bilaterally.  HEART: Irregularly irregular rhythm with a tachycardic rate.  Normal S1, S2 no S3, or S4. No murmur.  ABDOMEN: There is no focal tenderness to palpation appreciated anywhere throughout the abdomen. There is no guarding, no rebound, no mass, and no organomegaly appreciated. There is normoactive bowel sounds.   EXTREMITIES: There is no cyanosis, clubbing, or edema appreciated. Pulses are 2+ and equal in all 4 extremities.  NEURO: Patient is awake, alert and oriented to time place and person. Motor strength is 5 over 5 in all 4 extremities. There are no gross motor or sensory deficits appreciated.  Patient is up and ambulatory in the ER with a steady gait and no ataxia.        ED Course   Labs Reviewed - No data to display  EKG    Rate, intervals and axes as noted on EKG Report.  Rate: 94  Rhythm: Atrial fibrillation  Reading: Atrial fibrillation with nonspecific ST change no acute ST elevation appreciated.                   MDM     Patient with EKG as noted above.  The patient's heart rate upon arrival to the immediate care is 133.  Patient had atrial fibrillation with rapid ventricular response although heart rate appears to be improved when he is at rest.  I discussed with the patient we need to transfer him to the hospital for further treatment at this time as he may need additional medication for rate control that is not available here at the immediate care.  The patient is aware that he needs to go directly to the emergency room for further care at this time.  I did offer to send the patient by ambulance to the hospital at this time and the patient is refusing and AGAINST MEDICAL ADVICE was signed by patient the patient will be sent to the emergency room for further care at this  time.    Medical Decision Making      Disposition and Plan     Clinical Impression:  1. Atrial fibrillation with RVR (HCC)         Disposition:  Ic to ed  10/3/2024  4:22 pm    Follow-up:  No follow-up provider specified.        Medications Prescribed:  Discharge Medication List as of 10/3/2024  4:26 PM              Supplementary Documentation:

## 2024-10-03 NOTE — ED INITIAL ASSESSMENT (HPI)
C/o palpitations. EKG shows afib. Pt has hx of afib. Pt takes metoprolol, eliquis and cardizem.  Took meds today.

## 2024-10-03 NOTE — ED PROVIDER NOTES
Patient Seen in: [facility] Emergency Department      History     Chief Complaint   Patient presents with    Arrythmia/Palpitations     Stated Complaint: afib sent from     Subjective:   HPI    This is a 59-year-old male past medical history of hypertension, atrial fib on metoprolol, Eliquis and Cardizem, prostate cancer, hyperlipidemia who presents with palpitations.  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  Patient is due to have a cardiac ablation at [employer] later this month.  He initially went to urgent care with chief complaint of his heart racing.  No chest pain.  No shortness of breath.  Compliant with medications.  At the urgent care his heart rate was 133.  He was sent to the emergency room for further management.  Patient reports that he works nights.  He was told to go off his Cardizem about a month ago and just down the metoprolol but he continued to take it.  Today he took his metoprolol early because his heart rate was elevated.  He noticed it up to in the 120s.  This was around 1 PM at 2 PM he took a dose of Cardizem 180.  His metoprolol was taken earlier this morning.  He was also did increase his metoprolol from  but he is only been taking 50.  He denies any chest pain or shortness of breath.  No fevers chills cough or cold symptoms.  Presents here now for further evaluation    Objective:     Past Medical History:    Cancer (HCC)    prostate    Essential hypertension    Hyperlipidemia    Hyperlipidemia    Primary hypertension    Visual impairment    glasses              Past Surgical History:   Procedure Laterality Date    Laparoscopy, surgical prostatectomy, retropubic radical, w/nerve sparing  04/01/2019                Social History     Socioeconomic History    Marital status:    Tobacco Use    Smoking status: Never     Passive exposure: Never    Smokeless tobacco: Never   Vaping Use    Vaping status: Never Used   Substance and Sexual Activity     Alcohol use: Yes     Alcohol/week: 2.0 standard drinks of alcohol     Types: 2 Glasses of wine per week     Comment: social    Drug use: Never     Social Determinants of Health     Food Insecurity: No Food Insecurity (6/13/2024)    Food Insecurity     Food Insecurity: Never true   Transportation Needs: No Transportation Needs (6/13/2024)    Transportation Needs     Lack of Transportation: No   Housing Stability: Low Risk  (6/13/2024)    Housing Stability     Housing Instability: No                  Physical Exam     ED Triage Vitals [10/03/24 1715]   /79   Pulse 91   Resp 18   Temp 98.4 °F (36.9 °C)   Temp src Temporal   SpO2 97 %   O2 Device None (Room air)       Current Vitals:   Vital Signs  BP: 138/88  Pulse: 71  Resp: 26  Temp: 98.4 °F (36.9 °C)  Temp src: Temporal  MAP (mmHg): (!) 104    Oxygen Therapy  SpO2: 99 %  O2 Device: None (Room air)        Physical Exam  GENERAL: Awake, alert oriented x3, nontoxic appearing.   SKIN: Normal, warm, and dry.  HEENT:  Pupils equally round and reactive to light. Conjuctiva clear.  Oropharynx is clear and moist.   Lungs: Clear to auscultation bilaterally with no rales, no retractions, and no wheezing.  HEART: Irregular rate and rhythm. S1 and S2. No murmurs, no rubs or gallops.   ABDOMEN: Soft, nontender and nondistended. Normoactive bowel sounds. No rebound. No guarding.   EXTREMITIES: Warm with brisk capillary refill.         ED Course     Labs Reviewed   COMP METABOLIC PANEL (14) - Abnormal; Notable for the following components:       Result Value    Glucose 140 (*)     BUN 7 (*)     All other components within normal limits   TROPONIN I HIGH SENSITIVITY - Normal   CBC WITH DIFFERENTIAL WITH PLATELET     EKG    Rate, intervals and axes as noted on EKG Report.  Rate: 88  Rhythm: Atrial Fibrillation  Reading: Nonspecific ST changes.                       MDM        This is a 59-year-old male past medical history of hypertension, atrial fib on metoprolol, Eliquis and  Cardizem, prostate cancer, hyperlipidemia who presents with palpitations.  Patient was found to be in rapid A-fib at urgent care and sent here for further evaluation.      Patient placed on cardiac monitor, continuous pulse oximetry and IV line was established of normal saline.  Basic labs were obtained.  CBC: White blood cell count 5.8.  Hemoglobin 16.6.  Platelet 171.  CMP: BUN 7.  Creatinine 0.7 per glucose 140.  Bicarb 26.  Troponin 6.    Patient stable in no distress.  We discussed increasing his metoprolol from  as his cardiologist wanted him to do.  He can continue his Cardizem and call him tomorrow to determine further management.  He is comfortable with this plan.  He was discharged home in good condition with family.      Disposition and Plan     Clinical Impression:  1. Atrial fibrillation, chronic (HCC)         Disposition:  Discharge  10/3/2024  6:37 pm    Follow-up:  your cardiologist    Follow up today            Medications Prescribed:  Current Discharge Medication List              Supplementary Documentation:

## 2024-10-03 NOTE — ED INITIAL ASSESSMENT (HPI)
Heart racing started  at 1 pm ; highest pulse was 130, denies chest pain , sob, numbness or tingling in the arms and legs.  Pt had similar symptoms back in July.  Pt was dx  with naomy mustafa

## 2024-10-04 LAB
Q-T INTERVAL: 342 MS
Q-T INTERVAL: 352 MS
QRS DURATION: 102 MS
QRS DURATION: 94 MS
QTC CALCULATION (BEZET): 425 MS
QTC CALCULATION (BEZET): 427 MS
R AXIS: 14 DEGREES
R AXIS: 8 DEGREES
T AXIS: 33 DEGREES
T AXIS: 57 DEGREES
VENTRICULAR RATE: 88 BPM
VENTRICULAR RATE: 94 BPM

## 2024-10-05 ENCOUNTER — APPOINTMENT (OUTPATIENT)
Dept: GENERAL RADIOLOGY | Facility: HOSPITAL | Age: 59
End: 2024-10-05
Payer: COMMERCIAL

## 2024-10-05 ENCOUNTER — HOSPITAL ENCOUNTER (EMERGENCY)
Facility: HOSPITAL | Age: 59
Discharge: HOME | End: 2024-10-05
Attending: EMERGENCY MEDICINE
Payer: COMMERCIAL

## 2024-10-05 VITALS
RESPIRATION RATE: 22 BRPM | TEMPERATURE: 98 F | HEIGHT: 70 IN | DIASTOLIC BLOOD PRESSURE: 86 MMHG | OXYGEN SATURATION: 97 % | WEIGHT: 275 LBS | BODY MASS INDEX: 39.37 KG/M2 | HEART RATE: 107 BPM | SYSTOLIC BLOOD PRESSURE: 134 MMHG

## 2024-10-05 DIAGNOSIS — I48.91 ATRIAL FIBRILLATION WITH RAPID VENTRICULAR RESPONSE (HCC): Primary | ICD-10-CM

## 2024-10-05 LAB
ALBUMIN SERPL-MCNC: 4.1 G/DL (ref 3.2–4.8)
ALBUMIN/GLOB SERPL: 1.4 {RATIO} (ref 1–2)
ALP LIVER SERPL-CCNC: 85 U/L
ALT SERPL-CCNC: 22 U/L
ANION GAP SERPL CALC-SCNC: 6 MMOL/L (ref 0–18)
AST SERPL-CCNC: 30 U/L (ref ?–34)
BASOPHILS # BLD AUTO: 0.08 X10(3) UL (ref 0–0.2)
BASOPHILS NFR BLD AUTO: 0.9 %
BILIRUB SERPL-MCNC: 0.6 MG/DL (ref 0.2–1.1)
BUN BLD-MCNC: 6 MG/DL (ref 9–23)
CALCIUM BLD-MCNC: 9.5 MG/DL (ref 8.7–10.4)
CHLORIDE SERPL-SCNC: 110 MMOL/L (ref 98–112)
CO2 SERPL-SCNC: 23 MMOL/L (ref 21–32)
CREAT BLD-MCNC: 0.93 MG/DL
EGFRCR SERPLBLD CKD-EPI 2021: 93 ML/MIN/1.73M2 (ref 60–?)
EOSINOPHIL # BLD AUTO: 0.16 X10(3) UL (ref 0–0.7)
EOSINOPHIL NFR BLD AUTO: 1.9 %
ERYTHROCYTE [DISTWIDTH] IN BLOOD BY AUTOMATED COUNT: 13 %
GLOBULIN PLAS-MCNC: 2.9 G/DL (ref 2–3.5)
GLUCOSE BLD-MCNC: 152 MG/DL (ref 70–99)
HCT VFR BLD AUTO: 52.5 %
HGB BLD-MCNC: 18.2 G/DL
IMM GRANULOCYTES # BLD AUTO: 0.02 X10(3) UL (ref 0–1)
IMM GRANULOCYTES NFR BLD: 0.2 %
LYMPHOCYTES # BLD AUTO: 1.97 X10(3) UL (ref 1–4)
LYMPHOCYTES NFR BLD AUTO: 23 %
MCH RBC QN AUTO: 30.1 PG (ref 26–34)
MCHC RBC AUTO-ENTMCNC: 34.7 G/DL (ref 31–37)
MCV RBC AUTO: 86.9 FL
MONOCYTES # BLD AUTO: 0.99 X10(3) UL (ref 0.1–1)
MONOCYTES NFR BLD AUTO: 11.6 %
NEUTROPHILS # BLD AUTO: 5.34 X10 (3) UL (ref 1.5–7.7)
NEUTROPHILS # BLD AUTO: 5.34 X10(3) UL (ref 1.5–7.7)
NEUTROPHILS NFR BLD AUTO: 62.4 %
OSMOLALITY SERPL CALC.SUM OF ELEC: 289 MOSM/KG (ref 275–295)
PLATELET # BLD AUTO: 222 10(3)UL (ref 150–450)
POTASSIUM SERPL-SCNC: 4.1 MMOL/L (ref 3.5–5.1)
PROT SERPL-MCNC: 7 G/DL (ref 5.7–8.2)
RBC # BLD AUTO: 6.04 X10(6)UL
SODIUM SERPL-SCNC: 139 MMOL/L (ref 136–145)
TROPONIN I SERPL HS-MCNC: 3 NG/L
WBC # BLD AUTO: 8.6 X10(3) UL (ref 4–11)

## 2024-10-05 PROCEDURE — 93005 ELECTROCARDIOGRAM TRACING: CPT

## 2024-10-05 PROCEDURE — 84484 ASSAY OF TROPONIN QUANT: CPT | Performed by: EMERGENCY MEDICINE

## 2024-10-05 PROCEDURE — 96376 TX/PRO/DX INJ SAME DRUG ADON: CPT

## 2024-10-05 PROCEDURE — 99285 EMERGENCY DEPT VISIT HI MDM: CPT

## 2024-10-05 PROCEDURE — 80053 COMPREHEN METABOLIC PANEL: CPT

## 2024-10-05 PROCEDURE — 93010 ELECTROCARDIOGRAM REPORT: CPT

## 2024-10-05 PROCEDURE — 80053 COMPREHEN METABOLIC PANEL: CPT | Performed by: EMERGENCY MEDICINE

## 2024-10-05 PROCEDURE — 71045 X-RAY EXAM CHEST 1 VIEW: CPT | Performed by: EMERGENCY MEDICINE

## 2024-10-05 PROCEDURE — 84484 ASSAY OF TROPONIN QUANT: CPT

## 2024-10-05 PROCEDURE — 85025 COMPLETE CBC W/AUTO DIFF WBC: CPT | Performed by: EMERGENCY MEDICINE

## 2024-10-05 PROCEDURE — 85025 COMPLETE CBC W/AUTO DIFF WBC: CPT

## 2024-10-05 PROCEDURE — 96374 THER/PROPH/DIAG INJ IV PUSH: CPT

## 2024-10-05 RX ORDER — METOPROLOL TARTRATE 1 MG/ML
5 INJECTION, SOLUTION INTRAVENOUS ONCE
Status: COMPLETED | OUTPATIENT
Start: 2024-10-05 | End: 2024-10-05

## 2024-10-05 NOTE — ED INITIAL ASSESSMENT (HPI)
Pt arrives to ED for evaluation of continues issues with his Afib, pt arrives in Afib, pt seen two days ago for same issue. Pt states his Cardizem was stopped and his metoprolol was increased to 100, with an extra 50 at night. Pt c/o mild chest discomfort and some SOB.

## 2024-10-05 NOTE — ED PROVIDER NOTES
Patient Seen in: [facility] Emergency Department      History     Chief Complaint   Patient presents with    Arrythmia/Palpitations     Stated Complaint: afib, hx of same. Seen and treated here on Thursday.    Subjective:   HPI    59-year-old male presents to the emergency department with increased heart rate.  Patient has a known history of paroxysmal atrial fibrillation.  He states that he was doing well and then has had episodes of going into the atrial fibrillation and having a rapid heart rate.  He recently had his metoprolol increased from 50 mg to 100 mg and they had him hold his Cardizem.  Patient states that he has been doing that and now he is having worsening problems.  He states he is feeling short of breath and his heart rates been in the 130 range.  No fevers or chills no cough cold or congestion.  He is anticoagulated.    Objective:     Past Medical History:    Cancer (HCC)    prostate    Essential hypertension    Hyperlipidemia    Hyperlipidemia    Primary hypertension    Visual impairment    glasses              Past Surgical History:   Procedure Laterality Date    Laparoscopy, surgical prostatectomy, retropubic radical, w/nerve sparing  04/01/2019                Social History     Socioeconomic History    Marital status:    Tobacco Use    Smoking status: Never     Passive exposure: Never    Smokeless tobacco: Never   Vaping Use    Vaping status: Never Used   Substance and Sexual Activity    Alcohol use: Yes     Alcohol/week: 2.0 standard drinks of alcohol     Types: 2 Glasses of wine per week     Comment: social    Drug use: Never     Social Determinants of Health     Food Insecurity: No Food Insecurity (6/13/2024)    Food Insecurity     Food Insecurity: Never true   Transportation Needs: No Transportation Needs (6/13/2024)    Transportation Needs     Lack of Transportation: No   Housing Stability: Low Risk  (6/13/2024)    Housing Stability     Housing Instability: No                   Physical Exam     ED Triage Vitals   BP 10/05/24 1730 129/89   Pulse 10/05/24 1728 (!) 129   Resp 10/05/24 1728 16   Temp 10/05/24 1728 98.1 °F (36.7 °C)   Temp src 10/05/24 1728 Oral   SpO2 10/05/24 1728 97 %   O2 Device 10/05/24 1732 None (Room air)       Current Vitals:   Vital Signs  BP: 134/86  Pulse: 107  Resp: 22  Temp: 98.1 °F (36.7 °C)  Temp src: Oral  MAP (mmHg): (!) 102    Oxygen Therapy  SpO2: 97 %  O2 Device: None (Room air)        Physical Exam  Vitals and nursing note reviewed.   Constitutional:       General: He is not in acute distress.     Appearance: Normal appearance. He is well-developed.   HENT:      Head: Normocephalic and atraumatic.   Cardiovascular:      Rate and Rhythm: Tachycardia present. Rhythm irregular.      Pulses: Normal pulses.      Heart sounds: Normal heart sounds.   Pulmonary:      Effort: Pulmonary effort is normal.      Breath sounds: Normal breath sounds. No stridor.   Abdominal:      General: Bowel sounds are normal.      Palpations: Abdomen is soft.   Musculoskeletal:         General: Normal range of motion.      Cervical back: Normal range of motion and neck supple.      Right lower leg: No edema.      Left lower leg: No edema.   Lymphadenopathy:      Cervical: No cervical adenopathy.   Skin:     General: Skin is warm and dry.      Capillary Refill: Capillary refill takes less than 2 seconds.   Neurological:      General: No focal deficit present.      Mental Status: He is alert and oriented to person, place, and time.            ED Course     Labs Reviewed   COMP METABOLIC PANEL (14) - Abnormal; Notable for the following components:       Result Value    Glucose 152 (*)     BUN 6 (*)     All other components within normal limits   CBC WITH DIFFERENTIAL WITH PLATELET - Abnormal; Notable for the following components:    RBC 6.04 (*)     HGB 18.2 (*)     All other components within normal limits   TROPONIN I HIGH SENSITIVITY - Normal   RAINBOW DRAW LAVENDER    RAINBOW DRAW LIGHT GREEN   RAINBOW DRAW BLUE     EKG    Rate, intervals and axes as noted on EKG Report.  Rate: 128  Rhythm: Atrial Fibrillation  Reading: No acute ST segment elevation              Independent interpretation of imaging : noted evidence of significant pleural effusion or pneumothorax  XR CHEST AP PORTABLE  (CPT=71045)    Result Date: 10/5/2024  PROCEDURE:  XR CHEST AP PORTABLE  (CPT=71045)  TECHNIQUE:  AP chest radiograph was obtained.  COMPARISON:  [facility], XR, XR CHEST AP PORTABLE  (CPT=71045), 6/13/2024, 6:40 PM.  INDICATIONS:  afib, hx of same. Seen and treated here on Thursday.  PATIENT STATED HISTORY: (As transcribed by Technologist)  Patient states he is experiencing afib and is experiencing chest discomfort and shortness of breath.    FINDINGS:  The heart is normal in size.  The lungs are clear.  There are no pleural effusions.  The bones are unremarkable.            CONCLUSION:  No acute disease.    LOCATION:  [facility]      Dictated by (CST): Isaac West MD on 10/05/2024 at 5:58 PM     Finalized by (CST): Isaac West MD on 10/05/2024 at 5:58 PM            MDM      Patient had an IV established and blood work obtained I reviewed his previous records as well as reviewed his records from care everywhere.  He is being followed by electrophysiology.  He is adequately anticoagulated.  They are recently adjusting his beta-blockers.  He was given a dose of Lopressor IV and his blood pressure remained stable and his heart rate came down.  Patient's blood work reveals a slight elevation in his hemoglobin this could be related to dehydration he is advised to stay hydrated and push fluids as dehydration could worsen his atrial fibrillation.  He does not have an elevated troponin or any other abnormalities there is no signs of fluid overload.  I was able to contact his electrophysiologist from [facility].  We discussed the case and his medications and at this time would like me to increase  his metoprolol from daily to twice daily.  I reviewed this with the patient and he is understanding and agreeable.  Patient is a nurse himself.  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  He is to keep his appointment with his cardiologist should he have any further issues he is to contact them.  Currently he is hemodynamically stable.  Patient was discharged in good condition                    Medical Decision Making      Disposition and Plan     Clinical Impression:  1. Atrial fibrillation with rapid ventricular response (HCC)         Disposition:  Discharge  10/5/2024  7:01 pm    Follow-up:  Jaswinder Campos  [address] [facility]  [address]  [facility] [id number] [phone]    Call  If symptoms worsen          Medications Prescribed:  Discharge Medication List as of 10/5/2024  7:03 PM              Supplementary Documentation:

## 2024-10-06 NOTE — DISCHARGE INSTRUCTIONS
Increase your metoprolol to 100 mg twice a day.  Stay well-hydrated.  Hold the Cardizem.  Contact cardiology for any further issues.

## 2024-10-07 LAB
Q-T INTERVAL: 300 MS
QRS DURATION: 96 MS
QTC CALCULATION (BEZET): 438 MS
R AXIS: 9 DEGREES
T AXIS: 146 DEGREES
VENTRICULAR RATE: 128 BPM

## 2024-10-26 ENCOUNTER — HOSPITAL ENCOUNTER (EMERGENCY)
Facility: HOSPITAL | Age: 59
Discharge: HOME | End: 2024-10-26
Attending: EMERGENCY MEDICINE
Payer: COMMERCIAL

## 2024-10-26 ENCOUNTER — APPOINTMENT (OUTPATIENT)
Dept: MRI IMAGING | Facility: HOSPITAL | Age: 59
End: 2024-10-26
Attending: EMERGENCY MEDICINE
Payer: COMMERCIAL

## 2024-10-26 VITALS
SYSTOLIC BLOOD PRESSURE: 138 MMHG | TEMPERATURE: 98 F | BODY MASS INDEX: 38.65 KG/M2 | RESPIRATION RATE: 16 BRPM | DIASTOLIC BLOOD PRESSURE: 92 MMHG | WEIGHT: 270 LBS | HEIGHT: 70 IN | OXYGEN SATURATION: 96 % | HEART RATE: 70 BPM

## 2024-10-26 DIAGNOSIS — I74.9 TIA DUE TO EMBOLISM (HCC): Primary | ICD-10-CM

## 2024-10-26 DIAGNOSIS — G45.9 TIA DUE TO EMBOLISM (HCC): Primary | ICD-10-CM

## 2024-10-26 LAB
ALBUMIN SERPL-MCNC: 4.2 G/DL (ref 3.2–4.8)
ALBUMIN/GLOB SERPL: 1.7 {RATIO} (ref 1–2)
ALP LIVER SERPL-CCNC: 84 U/L
ALT SERPL-CCNC: 29 U/L
ANION GAP SERPL CALC-SCNC: 4 MMOL/L (ref 0–18)
AST SERPL-CCNC: 26 U/L (ref ?–34)
ATRIAL RATE: 67 BPM
BASOPHILS # BLD AUTO: 0.04 X10(3) UL (ref 0–0.2)
BASOPHILS NFR BLD AUTO: 0.5 %
BILIRUB SERPL-MCNC: 0.9 MG/DL (ref 0.2–1.1)
BUN BLD-MCNC: 7 MG/DL (ref 9–23)
CALCIUM BLD-MCNC: 10.2 MG/DL (ref 8.7–10.4)
CHLORIDE SERPL-SCNC: 105 MMOL/L (ref 98–112)
CO2 SERPL-SCNC: 30 MMOL/L (ref 21–32)
CREAT BLD-MCNC: 0.88 MG/DL
CRP SERPL-MCNC: <0.4 MG/DL (ref ?–0.5)
EGFRCR SERPLBLD CKD-EPI 2021: 98 ML/MIN/1.73M2 (ref 60–?)
EOSINOPHIL # BLD AUTO: 0.25 X10(3) UL (ref 0–0.7)
EOSINOPHIL NFR BLD AUTO: 3.2 %
ERYTHROCYTE [DISTWIDTH] IN BLOOD BY AUTOMATED COUNT: 12.8 %
ERYTHROCYTE [SEDIMENTATION RATE] IN BLOOD: 9 MM/HR
GLOBULIN PLAS-MCNC: 2.5 G/DL (ref 2–3.5)
GLUCOSE BLD-MCNC: 136 MG/DL (ref 70–99)
HCT VFR BLD AUTO: 47.6 %
HGB BLD-MCNC: 16 G/DL
IMM GRANULOCYTES # BLD AUTO: 0.06 X10(3) UL (ref 0–1)
IMM GRANULOCYTES NFR BLD: 0.8 %
LYMPHOCYTES # BLD AUTO: 1.05 X10(3) UL (ref 1–4)
LYMPHOCYTES NFR BLD AUTO: 13.3 %
MCH RBC QN AUTO: 30 PG (ref 26–34)
MCHC RBC AUTO-ENTMCNC: 33.6 G/DL (ref 31–37)
MCV RBC AUTO: 89.1 FL
MONOCYTES # BLD AUTO: 0.7 X10(3) UL (ref 0.1–1)
MONOCYTES NFR BLD AUTO: 8.9 %
NEUTROPHILS # BLD AUTO: 5.77 X10 (3) UL (ref 1.5–7.7)
NEUTROPHILS # BLD AUTO: 5.77 X10(3) UL (ref 1.5–7.7)
NEUTROPHILS NFR BLD AUTO: 73.3 %
OSMOLALITY SERPL CALC.SUM OF ELEC: 288 MOSM/KG (ref 275–295)
P AXIS: 42 DEGREES
P-R INTERVAL: 150 MS
PLATELET # BLD AUTO: 143 10(3)UL (ref 150–450)
PLATELETS.RETICULATED NFR BLD AUTO: 4.2 % (ref 0–7)
POTASSIUM SERPL-SCNC: 4.6 MMOL/L (ref 3.5–5.1)
PROT SERPL-MCNC: 6.7 G/DL (ref 5.7–8.2)
Q-T INTERVAL: 388 MS
QRS DURATION: 92 MS
QTC CALCULATION (BEZET): 409 MS
R AXIS: 5 DEGREES
RBC # BLD AUTO: 5.34 X10(6)UL
SODIUM SERPL-SCNC: 139 MMOL/L (ref 136–145)
T AXIS: 8 DEGREES
VENTRICULAR RATE: 67 BPM
WBC # BLD AUTO: 7.9 X10(3) UL (ref 4–11)

## 2024-10-26 PROCEDURE — 80053 COMPREHEN METABOLIC PANEL: CPT | Performed by: EMERGENCY MEDICINE

## 2024-10-26 PROCEDURE — 99285 EMERGENCY DEPT VISIT HI MDM: CPT

## 2024-10-26 PROCEDURE — 70546 MR ANGIOGRAPH HEAD W/O&W/DYE: CPT | Performed by: EMERGENCY MEDICINE

## 2024-10-26 PROCEDURE — 85652 RBC SED RATE AUTOMATED: CPT | Performed by: EMERGENCY MEDICINE

## 2024-10-26 PROCEDURE — 86140 C-REACTIVE PROTEIN: CPT | Performed by: EMERGENCY MEDICINE

## 2024-10-26 PROCEDURE — 70549 MR ANGIOGRAPH NECK W/O&W/DYE: CPT | Performed by: EMERGENCY MEDICINE

## 2024-10-26 PROCEDURE — 36415 COLL VENOUS BLD VENIPUNCTURE: CPT

## 2024-10-26 PROCEDURE — 85025 COMPLETE CBC W/AUTO DIFF WBC: CPT | Performed by: EMERGENCY MEDICINE

## 2024-10-26 PROCEDURE — 93005 ELECTROCARDIOGRAM TRACING: CPT

## 2024-10-26 PROCEDURE — 70553 MRI BRAIN STEM W/O & W/DYE: CPT | Performed by: EMERGENCY MEDICINE

## 2024-10-26 PROCEDURE — A9575 INJ GADOTERATE MEGLUMI 0.1ML: HCPCS

## 2024-10-26 PROCEDURE — 93010 ELECTROCARDIOGRAM REPORT: CPT

## 2024-10-26 RX ORDER — DIPHENHYDRAMINE HYDROCHLORIDE 50 MG/ML
10 INJECTION, SOLUTION INTRAMUSCULAR; INTRAVENOUS
Status: COMPLETED | OUTPATIENT
Start: 2024-10-26 | End: 2024-10-26

## 2024-10-26 RX ORDER — ASPIRIN 81 MG/1
81 TABLET ORAL DAILY
Qty: 30 TABLET | Refills: 0 | Status: SHIPPED | OUTPATIENT
Start: 2024-10-26 | End: 2024-11-25

## 2024-10-26 NOTE — ED INITIAL ASSESSMENT (HPI)
Pt states had difficulty with the vision in his right eye with HA, states first episode was yesterday while at [facility].  S/p cardiac ablation on 10/22.  Pt states vision has returned to normal.

## 2024-10-26 NOTE — ED PROVIDER NOTES
Patient Seen in: [facility] Emergency Department      History     Chief Complaint   Patient presents with    Headache     Stated Complaint: headache, post ablation    Subjective:   HPI      Patient is a 59-year-old male had a cardiac ablation for A-fib 10-22 at [facility].  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  Patient states that he is on Eliquis 5 mg twice daily and has been prior to the procedure.  Patient states he was at a store yesterday and had an episode of headache mild right side of his head dull ache that lasted about an hour and then transient blurred vision in his right eye which she describes as looking through a prism which lasted about 30 minutes.  Patient states those symptoms resolved.  Patient states he had another episode about an hour ago where he developed headache on the right side again and had the transient visual disturbance in the right eye which again lasted about 30 minutes and resolved.  Patient does complain of mild dull right-sided headache.  No weakness numbness, no slurred speech, no blurry vision double vision.  Patient does have chronic right facial droop from Bell's palsy years ago.  Remainder of review of systems negative.    Objective:     Past Medical History:    Cancer (HCC)    prostate    Essential hypertension    Hyperlipidemia    Hyperlipidemia    Primary hypertension    Visual impairment    glasses              Past Surgical History:   Procedure Laterality Date    Laparoscopy, surgical prostatectomy, retropubic radical, w/nerve sparing  04/01/2019                Social History     Socioeconomic History    Marital status:    Tobacco Use    Smoking status: Never     Passive exposure: Never    Smokeless tobacco: Never   Vaping Use    Vaping status: Never Used   Substance and Sexual Activity    Alcohol use: Yes     Alcohol/week: 2.0 standard drinks of alcohol     Types: 2 Glasses of wine per week     Comment: social    Drug use:  Never     Social Drivers of Health     Food Insecurity: No Food Insecurity (6/13/2024)    Food Insecurity     Food Insecurity: Never true   Transportation Needs: No Transportation Needs (6/13/2024)    Transportation Needs     Lack of Transportation: No   Housing Stability: Low Risk  (6/13/2024)    Housing Stability     Housing Instability: No          Family history no history of premature stroke.  Patient has a history of family members with heart attacks before 65        Physical Exam     ED Triage Vitals [10/26/24 1133]   BP (!) 159/93   Pulse 70   Resp 14   Temp 98 °F (36.7 °C)   Temp src Oral   SpO2 97 %   O2 Device None (Room air)       Current Vitals:   Vital Signs  BP: 152/90  Pulse: 65  Resp: 22  Temp: 98 °F (36.7 °C)  Temp src: Oral  MAP (mmHg): (!) 108    Oxygen Therapy  SpO2: 99 %  O2 Device: None (Room air)      Right Eye Chart Acuity: 20/40, Corrected  Left Eye Chart Acuity: 20/50, Corrected  Physical Exam   GENERAL: Patient resting comfortably on the cart in no acute distress.  HEENT: Extraocular muscles intact, pupils equal round reactive to light and accommodation.  Mouth normal, neck supple, no meningismus.  Right facial droop, sensation tact better face.  Tongue midline.  Hearing intact bilaterally.  Normal shoulder shrug.  LUNGS: Lungs clear to auscultation bilaterally.  CARDIOVASCULAR: + S1-S2, regular rate and rhythm, no murmurs.  BACK: No CVA tenderness, no midline bony tenderness.  ABDOMEN: + Bowel sounds, soft, nontender, nondistended.  No rebound, no guarding, no hepatosplenomegaly.  EXTREMITIES: Full range of motion, no tenderness, good capillary refill.  SKIN: No rash, good turgor.  NEURO: Patient answers questions appropriately.   Conversant.  5 out of 5 strength upper lower extremities.  Sensation tact light touch of lower extremities.        ED Course     Labs Reviewed   COMP METABOLIC PANEL (14) - Abnormal; Notable for the following components:       Result Value    Glucose 136 (*)      BUN 7 (*)     All other components within normal limits   CBC WITH DIFFERENTIAL WITH PLATELET - Abnormal; Notable for the following components:    .0 (*)     All other components within normal limits   SED RATE, WESTERGREN (AUTOMATED) - Normal   C-REACTIVE PROTEIN - Normal   RAINBOW DRAW LAVENDER   RAINBOW DRAW LIGHT GREEN   RAINBOW DRAW GOLD   RAINBOW DRAW BLUE     EKG    Rate, intervals and axes as noted on EKG Report.  Rate: 67  Rhythm: Sinus Rhythm  Reading: Normal sinus rhythm, no acute changes                MRI/MRA head and neck       MDM      Patient was stable throughout his stay in the emergency department.  Patient not a candidate for TNK given resolution of his symptoms as well as being on apixaban.  Patient was discussed with neurology who did recommend MRI/MRA.  If this is negative patient can be discharged on baby aspirin daily and follow-up in TIA clinic.  Patient is comfortable with this plan.  I did consider subdural, TIA, amaurosis.  Patient will also be given ophthalmology follow-up.  MRI/MRA will be checked by oncoming emergency physician for final disposition.      Medical Decision Making      Disposition and Plan     Clinical Impression:  1. TIA due to embolism (HCC)         Disposition:  There is no disposition on file for this visit.  There is no disposition time on file for this visit.    Follow-up:  [facility], [place], [address]  [facility] [address] [phone]  Call  choose option 1 for general neurology and state that you are following up for TIA    Ceasar Borjas  [address]  [address]  [facility] [id number] [phone]    Follow up in 2 day(s)      Dakota David MD  [address]  [facility] [address] [phone]    Follow up in 2 day(s)            Medications Prescribed:  Current Discharge Medication List              Supplementary Documentation:

## 2024-10-26 NOTE — ED QUICK NOTES
Rounding Completed    Plan of Care reviewed. Waiting for CT, creatinine is fine.  Elimination needs assessed.  Provided update on results.    Bed is locked and in lowest position. Call light within reach. Family at bedside.

## 2024-10-26 NOTE — DISCHARGE INSTRUCTIONS
Follow-up for further evaluation with neurology TIA clinic.  Call Monday morning for appointment.  Return if new or worse symptoms.  Add baby aspirin daily.  Tylenol as needed.  Follow-up with ophthalmology primary physician as well.

## 2024-10-26 NOTE — ED PROVIDER NOTES
MRI BRAIN MRA BRAIN+MRA NECK (ALL W+WO) (CPT=70553/32693/79191)    Result Date: 10/26/2024  PROCEDURE:  MRI BRAIN MRA HEAD+MRA NECK (ALL W+WO) (CPT=70553/12287/61059)  COMPARISON:  None.  INDICATIONS:  transient r headache and blurred vision twice in 24 hrs. s/p cardiac ablation  TECHNIQUE:  MRI of the brain was performed with multi-planar T1, T2-weighted images with FLAIR sequences and diffusion weighted images without and with infusion.  MR angiography of the brain without and with infusion and MR angiography of the neck without and with infusion was performed using 3D time of flight, multi-planar and 3D reconstructed images. All measurements obtained in this exam were performed using NASCET criteria.  PATIENT STATED HISTORY:(As transcribed by Technologist)  Patient c/o headache and blurred vision.   CONTRAST USED:  20 mL of Dotarem  FINDINGS:  MRI BRAIN INTRACRANIAL:  Sagittal midline images revealing normal corpus callosum, optic chiasm, pituitary gland, aqueduct and 4th ventricle.  The cerebellar tonsils are not low lying.  No acute hemorrhage.  No acute infarct.  No mass enhancing lesion.  Normal enhancement of the meninges. VENTRICLES/SULCI:   Ventricles and sulci are normal in caliber.  There are no extra-axial fluid collections.  There is no midline shift. SINUSES/ORBITS:       The visualized paranasal sinuses are clear.  The orbits are unremarkable. MASTOIDS:       The mastoids are clear.  MRA BRAIN INTRACRANIAL CAROTIDS:         No visible stenosis or aneurysm. ANTERIOR CEREBRALS:         No visible stenosis or aneurysm. ANTERIOR COMMUNICATING:  No visible stenosis or aneurysm. MIDDLE CEREBRALS:         No visible stenosis or aneurysm. POSTERIOR CEREBRAL:              No visible stenosis or aneurysm. POSTERIOR COMMUNICATING: No visible stenosis or aneurysm. SUPERIOR CEREBELLARS:         No visible stenosis or aneurysm. BASILAR:             No visible stenosis or aneurysm. INTRACRANIAL VERTEBRALS:  The  minimal atherosclerotic change involving the V4 segment of the right vertebral artery without significant stenosis.  There is moderate stenosis involving the mid to distal V4 segment on the left most likely atherosclerotic disease.  Chronic dissection not excluded.  There is a prominent tortuous left PICA noted.  AORTA:  There is a bovine aortic arch without aneurysm or dissection.  The brachiocephalic artery, and subclavian arteries are unremarkable.  MRA NECK COMMON CAROTID:                 Normal for age with no visible significant stenosis or dissection. CERVICAL INTERNAL CAROTID:  Normal for age with no visible significant stenosis or dissection. EXTERNAL CAROTID:               Normal for age with no visible significant stenosis or dissection. CERVICAL VERTEBRAL:               Normal for age with no visible significant stenosis or dissection.            CONCLUSION:  1. No acute infarct, hemorrhage or mass enhancing lesion. 2. Normal MRA of the carotids. 3. No large vessel occlusion or aneurysm.  There is moderate stenosis involving the mid to distal V4 segment of the left vertebral artery , most likely atherosclerotic disease.  Chronic dissection not excluded.  There is a prominent tortuous left PICA noted.   LOCATION:  [facility]   Dictated by (CST): Nasir Carrasquillo MD on 10/26/2024 at 5:30 PM     Finalized by (CST): Nasir Carrasquillo MD on 10/26/2024 at 5:41 PM       I contacted Dr. Greenwood from neurology after the MRI results were available.  We went over the results and she was comfortable discharging the patient on a baby aspirin daily and having him follow-up in TIA clinic as planned.  I discussed this with the patient as well.

## 2024-10-28 ENCOUNTER — TELEPHONE (OUTPATIENT)
Dept: NEUROLOGY | Facility: CLINIC | Age: 59
End: 2024-10-28

## 2024-10-28 ENCOUNTER — OFFICE VISIT (OUTPATIENT)
Facility: CLINIC | Age: 59
End: 2024-10-28
Payer: COMMERCIAL

## 2024-10-28 ENCOUNTER — IMMUNIZATION (OUTPATIENT)
Dept: LAB | Age: 59
End: 2024-10-28
Attending: EMERGENCY MEDICINE
Payer: COMMERCIAL

## 2024-10-28 VITALS — SYSTOLIC BLOOD PRESSURE: 132 MMHG | HEART RATE: 70 BPM | DIASTOLIC BLOOD PRESSURE: 84 MMHG | RESPIRATION RATE: 16 BRPM

## 2024-10-28 DIAGNOSIS — Z23 NEED FOR VACCINATION: Primary | ICD-10-CM

## 2024-10-28 DIAGNOSIS — G45.9 TIA (TRANSIENT ISCHEMIC ATTACK): Primary | ICD-10-CM

## 2024-10-28 PROCEDURE — 3079F DIAST BP 80-89 MM HG: CPT | Performed by: OTHER

## 2024-10-28 PROCEDURE — 90656 IIV3 VACC NO PRSV 0.5 ML IM: CPT

## 2024-10-28 PROCEDURE — 99205 OFFICE O/P NEW HI 60 MIN: CPT | Performed by: OTHER

## 2024-10-28 PROCEDURE — 90471 IMMUNIZATION ADMIN: CPT

## 2024-10-28 PROCEDURE — 3075F SYST BP GE 130 - 139MM HG: CPT | Performed by: OTHER

## 2024-10-28 NOTE — PROGRESS NOTES
Neurology History & Physical     ASSESSMENT & PLAN:      ICD-10-CM    1. TIA (transient ischemic attack)  G45.9         Right sided vision change and headache, twice in 24 hrs in Oct 2024, MRI / MRA unremarkable other than left V4 stenosis.  This poses threat to bodily function, rebekah during the first 3 months.  LDL and TTE unremarkable in June 2024.  On lipitor + repatha.  Check A1c (with PCP tomorrow), HST pending.  I don't believe any other workup is needed - already on Eliquis and sees cardiology.  Could have been post-ablation TIA.  The evidence for ASA + Eliquis is weak.  I advised to stop ASA 81 mg daily after 3 weeks, but if cards takes him off Eliquis at some point, should restart/continue ASA 81 mg daily indefinitely.    We discussed symptoms that would warrant urgent/emergent evaluation. Patient verbalized understanding and agreement.    Return in about 2 months (around 12/28/2024).       ~~~~~~~~~~~~~~~~~~~~~~~~~~~    CHIEF COMPLAINT / REASON FOR VISIT:    Chief Complaint   Patient presents with    TIA     HISTORY OBTAINED FROM:  Patient and others as above  Chart review    HISTORY:  Francisco Rahman is a 59 year old male with HTN, HL, AF s/p ablation 10/22, on Eliquis.  Presented to ER 10/26/24 with headache, blurry vision in right eye.  This happened once on 10/25, lasting a few minutes, and once on 10/26, lasting 30-40 minutes, prompting ER visit.  Workup unremarkable and discharged home.  Started on ASA 81 mg daily in ER.    Had NEO in past but did lose weight (was positional).  May snore (mild and only when very tired).  Has poor sleep, getting HST done.    DATA REVIEWED:  As documented in the history    Oct 2024  ER note  MRI brain, MRA h/n \"moderate stenosis involving the mid to distal V4 segment of the left vertebral artery , most likely atherosclerotic disease.  Chronic dissection not excluded.  There is a prominent tortuous left PICA noted\"  (I independently analyzed and interpreted this, and I agree  with the reading physician report(s).)    June 2024  LDL 15  TTE unremarkable     PHYSICAL EXAMINATION:  /84   Pulse 70   Resp 16     Gen: in NAD  MSE: AAOx3, nl language, nl attn/conc, nl fund of knowledge  CN: PERRL, VFF; EOMI, no nystagmus; nl facial mvmt on left (right upper and lower facial weakness, had Bell's palsy 20+ years ago); nl hearing bilaterally; nl palate elevation bilaterally; nl voice; nl shoulder shrug b/I; nl tongue movement  Motor: 5/5 x4; no drift; normal tone; no abnormal movements  Sensory: nl vibration x4  Coord: nl FTN b/I  Reflex: 2+ BUE and BLE  Gait: normal    Allergies[1]    Current medications:   apixaban 5 MG Oral Tab Take 1 tablet (5 mg total) by mouth 2 (two) times daily.      aspirin 81 MG Oral Tab EC Take 1 tablet (81 mg total) by mouth daily. 30 tablet 0    dilTIAZem  MG Oral Capsule SR 24 Hr Take 1 capsule (180 mg total) by mouth daily. 30 capsule 3    metoprolol succinate ER 50 MG Oral Tablet 24 Hr Take 1 tablet (50 mg total) by mouth daily.      Evolocumab 140 MG/ML Subcutaneous Solution Auto-injector Inject 1 mL into the skin every 14 (fourteen) days.      rosuvastatin 5 MG Oral Tab Take 1 tablet (5 mg total) by mouth daily.      EPINEPHrine 0.3 MG/0.3ML Injection Solution Auto-injector Inject 0.3 mL (1 each total) into the muscle as needed.      Pimecrolimus 1 % External Cream Apply topically 2 (two) times daily as needed.      Mirabegron ER 50 MG Oral Tablet 24 Hr Take 1 tablet (50 mg total) by mouth daily.      Vitamin D-Vitamin K (K2 PLUS D3 OR) Take 1 tablet by mouth 2 (two) times daily. Pt reports Vitamin D3 5000 units and Vitamin K 180 mcg      Cyanocobalamin (B-12 OR) Take 1 tablet by mouth daily.      diphenhydrAMINE HCl 25 MG Oral Tab Take 2 tablets (50 mg total) by mouth nightly as needed for Sleep.      B Complex Vitamins (B COMPLEX OR) Take 1 tablet by mouth daily.      Tretinoin 0.01 % External Gel Apply topically as needed.      Clindamycin  Phosphate 1 % External Gel Apply topically as needed.      SILDENAFIL CITRATE OR Take 1 tablet by mouth daily as needed. BEST TAKEN ON AN EMPTY STOMACH,TAKE 1 TABLET BY MOUTH 60 MINUTES BEFORE SEXUAL ACTIVITY         Past Medical History:    Cancer (HCC)    prostate    Essential hypertension    Hyperlipidemia    Hyperlipidemia    Primary hypertension    Visual impairment    glasses       Past Surgical History:   Procedure Laterality Date    Laparoscopy, surgical prostatectomy, retropubic radical, w/nerve sparing  04/01/2019    Other surgical history      cardiac ablation 10/22/24 [facility]       Social History     Socioeconomic History    Marital status:    Tobacco Use    Smoking status: Never     Passive exposure: Never    Smokeless tobacco: Never   Vaping Use    Vaping status: Never Used   Substance and Sexual Activity    Alcohol use: Yes     Alcohol/week: 2.0 standard drinks of alcohol     Types: 2 Glasses of wine per week     Comment: social    Drug use: Never   Other Topics Concern    Caffeine Concern No     Comment: 200mg caffeine pill occasionally.    Exercise Yes     Comment: 2x per week & walking       Family History   Problem Relation Age of Onset    Cancer Father         lung    Heart Disorder Father         a fib    Heart Disorder Mother         afib    Cancer Maternal Grandfather         prostate    Heart Disorder Maternal Grandfather         cbag       Madisyn Taveras MD, FAES, FAAN  Board-Certified in Neurology, Epilepsy, and Clinical Neurophysiology  [facility]         [1]   Allergies  Allergen Reactions    Chlorhexidine ANAPHYLAXIS

## 2024-10-28 NOTE — PATIENT INSTRUCTIONS
After your visit at the [facility] office  today,  please direct any follow up questions or medication needs to the staff in our  [place] office so that your concerns may be promptly addressed.  We are available through [employer] or at the numbers below:    The phone number is:   [phone] option #1    The fax number is:  [phone]    Your pharmacy should also send any requests electronically to the [place] office.     Refill policies:    Allow 2-3 business days for refills; controlled substances may take longer.  Contact your pharmacy at least 5 days prior to running out of medication and have them send an electronic request or submit request through the “request refill” option in your [employer] account.  Refills are not addressed on weekends; covering physicians do not authorize routine medications on weekends.  No narcotics or controlled substances are refilled after noon on Fridays or by on call physicians.  By law, narcotics must be electronically prescribed.  A 30 day supply with no refills is the maximum allowed.  If your prescription is due for a refill, you may be due for a follow up appointment.  To best provide you care, patients receiving routine medications need to be seen at least once a year.  Patients receiving narcotic/controlled substance medications need to be seen at least once every 3 months.  In the event that your preferred pharmacy does not have the requested medication in stock (e.g. Backordered), it is your responsibility to find another pharmacy that has the requested medication available.  We will gladly send a new prescription to that pharmacy at your request.    Scheduling Tests:    If your physician has ordered radiology tests such as MRI or CT scans, please contact Central Scheduling at [phone] right away to schedule the test.  Once scheduled, the Pending sale to [facility] Centralized Referral Team will work with your insurance carrier to obtain pre-certification or prior  authorization.  Depending on your insurance carrier, approval may take 3-10 days.  It is highly recommended patients assure they have received an authorization before having a test performed.  If test is done without insurance authorization, patient may be responsible for the entire amount billed.      Precertification and Prior Authorizations:  If your physician has recommended that you have a procedure or additional testing performed the [facility] Centralized Referral Team will contact your insurance carrier to obtain pre-certification or prior authorization.    You are strongly encouraged to contact your insurance carrier to verify that your procedure/test has been approved and is a COVERED benefit.  Although the [facility] Centralized Referral Team does its due diligence, the insurance carrier gives the disclaimer that \"Although the procedure is authorized, this does not guarantee payment.\"    Ultimately the patient is responsible for payment.   Thank you for your understanding in this matter.  Paperwork Completion:  If you require FMLA or disability paperwork for your recovery, please make sure to either drop it off or have it faxed to our office at [phone]. Be sure the form has your name and date of birth on it.  The form will be faxed to our Forms Department and they will complete it for you.  There is a 25$ fee for all forms that need to be filled out.  Please be aware there is a 10-14 day turnaround time.  You will need to sign a release of information (DAV) form if your paperwork does not come with one.  You may call the Forms Department with any questions at [phone].  Their fax number is [phone].

## 2024-10-28 NOTE — TELEPHONE ENCOUNTER
TIA CLINIC SCREENING    1. Date of ED visit/TIA diagnosis:  10/26/2024   Time of discharge from ED:  1909    2. Is patient currently admitted?  No   If YES - TIA Clinic Appointment not required.      3. Does patient already see an [facility] neurologist?  No  Name:     (if YES - TIA Clinic Appointment not required.  Route message on to patient's neurologist)    4. Patient's current anti-platelet therapy:  81mg ASA, Eliquis    5. Patient's current statin therapy:  rosuvastatin    6. Has 2D Echo with bubble test been done?  Yes  Date:  06/14/2024    7. Is TIA Clinic Appointment indicated?  Yes     If YES - route encounter to [facility] to schedule patient for appointment NO LATER THAN 48 HOURS AFTER ED DISCHARGE.     If UNSURE - route encounter to clinic provider for recommendation.     If NO - indicate reason and close encounter:

## 2024-11-07 ENCOUNTER — IMMUNIZATION (OUTPATIENT)
Dept: LAB | Age: 59
End: 2024-11-07
Attending: EMERGENCY MEDICINE
Payer: COMMERCIAL

## 2024-11-07 DIAGNOSIS — Z23 NEED FOR VACCINATION: Primary | ICD-10-CM

## 2024-11-07 PROCEDURE — 90480 ADMN SARSCOV2 VAC 1/ONLY CMP: CPT

## (undated) NOTE — LETTER
Date & Time: 8/9/2021, 5:24 PM  Patient: Shukri Negro  Encounter Provider(s):    Nestor Nagel PA-C       To Whom It May Concern:    Shukri Negro was seen and treated in our department on 8/9/2021. He should not return to work until 8/16/21.     If you h

## (undated) NOTE — LETTER
Date & Time: 10/3/2024, 4:23 PM  Patient: Francisco Rahman  Encounter Provider(s):    Andres Dimas MD         This certifies that I, Francisco Rahman, a patient at an [facility], am leaving the facility voluntarily and against the advice of my physician. I declined to be transported by ambulance to the Emergency room     I acknowledge that I have been:    1. informed that my physician believes that I need to receive care here;  2. informed that if I leave, I could become sicker or even die; and  3. provided discharge instructions consistent with my current diagnosis.    I hereby release my physician, the facility, and its employees from all responsibility for any ill effects which may result from this action.        __________________________________  Patient or authorized caregiver signature    __________________________________  RN signature    If no patient or patient representative signature was obtained, sign below to acknowledge that the form was reviewed with the patient and that the patient refused to sign.    __________________________________  RN signature

## (undated) NOTE — ED AVS SNAPSHOT
[facility] Emergency Department    [place] [address]    Phone:  [phone]    Fax:  [phone]           Abel Forrest   MRN: [id number]    Department:  [facility] Emergency Department   Date of Visit:  4/8/2017 predniSONE 20 MG Tabs   Quantity:  9 tablet   Commonly known as:  DELTASONE   Take 3 tablets (60 mg total) by mouth daily.             Where to Get Your Medications      You can get these medications from any pharmacy     Bring a paper prescription for return to your personal doctor) about any new or lasting problems. The primary care or specialist physician will see patients referred from the [facility] Emergency Department. Follow-up care is at the discretion of that Physician.     IF THERE IS ANY - If you have concerns related to behavioral health issues or thoughts of harming yourself, contact [address] [place] [address] at [phone].     - If you don’t have insurance, [facility] has partnered with [employer]

## (undated) NOTE — ED AVS SNAPSHOT
[facility] Emergency Department    [place] [address]    Phone:  [phone]    Fax:  [phone]           Lauren Barber   MRN: [id number]    Department:  [facility] Emergency Department   Date of Visit:  4/8/2017 IF THERE IS ANY CHANGE OR WORSENING OF YOUR CONDITION, CALL YOUR PRIMARY CARE PHYSICIAN AT ONCE OR RETURN IMMEDIATELY TO THE EMERGENCY DEPARTMENT.     If you have been prescribed any medication(s), please fill your prescription right away and begin taking t